# Patient Record
Sex: FEMALE | Race: WHITE | NOT HISPANIC OR LATINO | Employment: FULL TIME | URBAN - METROPOLITAN AREA
[De-identification: names, ages, dates, MRNs, and addresses within clinical notes are randomized per-mention and may not be internally consistent; named-entity substitution may affect disease eponyms.]

---

## 2018-04-27 ENCOUNTER — LAB REQUISITION (OUTPATIENT)
Dept: LAB | Facility: OTHER | Age: 23
End: 2018-04-27

## 2018-04-27 DIAGNOSIS — Z02.1 PHYSICAL EXAM, PRE-EMPLOYMENT: ICD-10-CM

## 2018-04-27 PROCEDURE — 85025 COMPLETE CBC W/AUTO DIFF WBC: CPT | Performed by: PREVENTIVE MEDICINE

## 2018-04-27 PROCEDURE — 80053 COMPREHEN METABOLIC PANEL: CPT | Performed by: PREVENTIVE MEDICINE

## 2018-04-27 PROCEDURE — 80061 LIPID PANEL: CPT | Performed by: PREVENTIVE MEDICINE

## 2018-04-27 PROCEDURE — 81001 URINALYSIS AUTO W/SCOPE: CPT | Performed by: PREVENTIVE MEDICINE

## 2018-04-28 LAB
ALBUMIN SERPL-MCNC: 5 G/DL (ref 3.5–5.2)
ALP SERPL-CCNC: 53 U/L (ref 39–117)
ALT SERPL W P-5'-P-CCNC: 14 U/L (ref 1–33)
AST SERPL-CCNC: 19 U/L (ref 1–32)
BACTERIA UR QL AUTO: ABNORMAL /HPF
BASOPHILS # BLD AUTO: 0.01 10*3/MM3 (ref 0–0.2)
BASOPHILS NFR BLD AUTO: 0.2 % (ref 0–1.5)
BILIRUB CONJ SERPL-MCNC: <0.2 MG/DL (ref 0–0.3)
BILIRUB SERPL-MCNC: 0.7 MG/DL (ref 0.1–1.2)
BILIRUB UR QL STRIP: NEGATIVE
BUN BLD-MCNC: 13 MG/DL (ref 6–20)
CALCIUM SPEC-SCNC: 10.1 MG/DL (ref 8.6–10.5)
CHLORIDE SERPL-SCNC: 100 MMOL/L (ref 98–107)
CHOLEST SERPL-MCNC: 222 MG/DL (ref 0–200)
CLARITY UR: CLEAR
CO2 SERPL-SCNC: 24.1 MMOL/L (ref 22–29)
COLOR UR: YELLOW
CREAT BLD-MCNC: 0.94 MG/DL (ref 0.57–1)
DEPRECATED RDW RBC AUTO: 46.3 FL (ref 37–54)
EOSINOPHIL # BLD AUTO: 0.04 10*3/MM3 (ref 0–0.7)
EOSINOPHIL NFR BLD AUTO: 0.7 % (ref 0.3–6.2)
ERYTHROCYTE [DISTWIDTH] IN BLOOD BY AUTOMATED COUNT: 13.5 % (ref 11.7–13)
GFR SERPL CREATININE-BSD FRML MDRD: 75 ML/MIN/1.73
GFR SERPL CREATININE-BSD FRML MDRD: 91 ML/MIN/1.73
GGT SERPL-CCNC: 16 U/L (ref 5–36)
GLUCOSE BLD-MCNC: 83 MG/DL (ref 65–99)
GLUCOSE UR STRIP-MCNC: NEGATIVE MG/DL
HCT VFR BLD AUTO: 45.1 % (ref 35.6–45.5)
HDLC SERPL-MCNC: 65 MG/DL (ref 40–60)
HGB BLD-MCNC: 14.6 G/DL (ref 11.9–15.5)
HGB UR QL STRIP.AUTO: ABNORMAL
HYALINE CASTS UR QL AUTO: ABNORMAL /LPF
IMM GRANULOCYTES # BLD: 0.01 10*3/MM3 (ref 0–0.03)
IMM GRANULOCYTES NFR BLD: 0.2 % (ref 0–0.5)
IRON 24H UR-MRATE: 160 MCG/DL (ref 37–145)
KETONES UR QL STRIP: NEGATIVE
LDH SERPL-CCNC: 212 U/L (ref 135–214)
LDLC SERPL CALC-MCNC: 147 MG/DL (ref 0–100)
LDLC/HDLC SERPL: 2.26 {RATIO}
LEUKOCYTE ESTERASE UR QL STRIP.AUTO: ABNORMAL
LYMPHOCYTES # BLD AUTO: 2.22 10*3/MM3 (ref 0.9–4.8)
LYMPHOCYTES NFR BLD AUTO: 36.4 % (ref 19.6–45.3)
MCH RBC QN AUTO: 30.4 PG (ref 26.9–32)
MCHC RBC AUTO-ENTMCNC: 32.4 G/DL (ref 32.4–36.3)
MCV RBC AUTO: 94 FL (ref 80.5–98.2)
MONOCYTES # BLD AUTO: 0.49 10*3/MM3 (ref 0.2–1.2)
MONOCYTES NFR BLD AUTO: 8 % (ref 5–12)
NEUTROPHILS # BLD AUTO: 3.34 10*3/MM3 (ref 1.9–8.1)
NEUTROPHILS NFR BLD AUTO: 54.7 % (ref 42.7–76)
NITRITE UR QL STRIP: NEGATIVE
PH UR STRIP.AUTO: <=5 [PH] (ref 5–8)
PHOSPHATE SERPL-MCNC: 3.9 MG/DL (ref 2.5–4.5)
PLATELET # BLD AUTO: 278 10*3/MM3 (ref 140–500)
PMV BLD AUTO: 10.5 FL (ref 6–12)
POTASSIUM BLD-SCNC: 4.5 MMOL/L (ref 3.5–5.2)
PROT SERPL-MCNC: 8.2 G/DL (ref 6–8.5)
PROT UR QL STRIP: NEGATIVE
RBC # BLD AUTO: 4.8 10*6/MM3 (ref 3.9–5.2)
RBC # UR: ABNORMAL /HPF
REF LAB TEST METHOD: ABNORMAL
SODIUM BLD-SCNC: 137 MMOL/L (ref 136–145)
SP GR UR STRIP: >=1.03 (ref 1–1.03)
SQUAMOUS #/AREA URNS HPF: ABNORMAL /HPF
TRIGL SERPL-MCNC: 50 MG/DL (ref 0–150)
URATE SERPL-MCNC: 5.8 MG/DL (ref 2.4–5.7)
UROBILINOGEN UR QL STRIP: ABNORMAL
VLDLC SERPL-MCNC: 10 MG/DL (ref 5–40)
WBC NRBC COR # BLD: 6.1 10*3/MM3 (ref 4.5–10.7)
WBC UR QL AUTO: ABNORMAL /HPF

## 2021-01-28 ENCOUNTER — APPOINTMENT (OUTPATIENT)
Dept: GENERAL RADIOLOGY | Facility: HOSPITAL | Age: 26
End: 2021-01-28

## 2021-01-28 ENCOUNTER — HOSPITAL ENCOUNTER (EMERGENCY)
Facility: HOSPITAL | Age: 26
Discharge: HOME | End: 2021-01-29
Attending: EMERGENCY MEDICINE | Admitting: EMERGENCY MEDICINE

## 2021-01-28 VITALS
HEIGHT: 68 IN | OXYGEN SATURATION: 96 % | WEIGHT: 190 LBS | DIASTOLIC BLOOD PRESSURE: 88 MMHG | RESPIRATION RATE: 18 BRPM | BODY MASS INDEX: 28.79 KG/M2 | TEMPERATURE: 98.2 F | SYSTOLIC BLOOD PRESSURE: 140 MMHG | HEART RATE: 92 BPM

## 2021-01-28 DIAGNOSIS — S80.01XA CONTUSION OF RIGHT KNEE, INITIAL ENCOUNTER: ICD-10-CM
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
## 2021-01-28 PROCEDURE — 99283 EMERGENCY DEPT VISIT LOW MDM: CPT

## 2021-01-28 PROCEDURE — 73560 X-RAY EXAM OF KNEE 1 OR 2: CPT

## 2021-12-09 ENCOUNTER — OFFICE VISIT (OUTPATIENT)
Dept: FAMILY MEDICINE CLINIC | Facility: CLINIC | Age: 26
End: 2021-12-09

## 2021-12-09 VITALS
BODY MASS INDEX: 31.22 KG/M2 | HEIGHT: 68 IN | WEIGHT: 206 LBS | TEMPERATURE: 99.8 F | RESPIRATION RATE: 18 BRPM | HEART RATE: 63 BPM | OXYGEN SATURATION: 98 % | DIASTOLIC BLOOD PRESSURE: 90 MMHG | SYSTOLIC BLOOD PRESSURE: 130 MMHG

## 2021-12-09 DIAGNOSIS — R05.8 COUGH WITH CONGESTION OF PARANASAL SINUS: ICD-10-CM

## 2021-12-09 DIAGNOSIS — R09.81 COUGH WITH CONGESTION OF PARANASAL SINUS: ICD-10-CM

## 2021-12-09 DIAGNOSIS — J01.10 ACUTE NON-RECURRENT FRONTAL SINUSITIS: Primary | ICD-10-CM

## 2021-12-09 PROCEDURE — 99203 OFFICE O/P NEW LOW 30 MIN: CPT | Performed by: NURSE PRACTITIONER

## 2021-12-09 RX ORDER — METHYLPREDNISOLONE 4 MG/1
TABLET ORAL
Qty: 21 TABLET | Refills: 0 | Status: SHIPPED | OUTPATIENT
Start: 2021-12-09

## 2021-12-09 RX ORDER — AMOXICILLIN AND CLAVULANATE POTASSIUM 875; 125 MG/1; MG/1
1 TABLET, FILM COATED ORAL EVERY 12 HOURS SCHEDULED
Qty: 20 TABLET | Refills: 0 | Status: SHIPPED | OUTPATIENT
Start: 2021-12-09

## 2021-12-09 NOTE — PROGRESS NOTES
"Chief Complaint  Cough (x 7 days   hasn't had a fever over 99.0, only other symptoms is fatigue but she said she isn't sleeping bc of the cough.  Took OTC Dayquil today around noon )    Moy Hill presents to [facility]    Review of Systems   Constitutional: Positive for fatigue and fever. Negative for chills.   HENT: Positive for congestion, postnasal drip and sinus pressure.    Respiratory: Positive for cough. Negative for shortness of breath and wheezing.    Cardiovascular: Negative.  Negative for chest pain, palpitations and leg swelling.   Gastrointestinal: Negative.  Negative for abdominal pain, constipation, diarrhea and vomiting.   Skin: Negative.  Negative for rash.   Neurological: Negative.  Negative for dizziness and light-headedness.   Psychiatric/Behavioral: Negative.    26-year-old female presents to the office today complaining of some ongoing sinus pain and pressure, nasal congestion, nonproductive cough, and low-grade fever for the past 7 to 10 days.  She is taking over-the-counter DayQuil that has only given her minor symptom relief.  She has had a low-grade fever T-max 99.4 and some lingering fatigue.  Both of her parents are also sick with similar symptoms.  No abdominal pain, no shortness of breath, no N//V/D.    She declines COVID-19 testing    She does not have any current medical problems.  She does not currently take any medications.    The following portions of the patient's history were reviewed and updated as appropriate: allergies, current medications, past family history, past medical history, past social history, past surgical history, and problem list       Objective   Vital Signs:   Vitals:    12/09/21 1546   BP: 130/90   BP Location: Left arm   Patient Position: Sitting   Cuff Size: Adult   Pulse: 63   Resp: 18   Temp: 99.8 °F (37.7 °C)   TempSrc: Skin   SpO2: 98%   Weight: 93.4 kg (206 lb)   Height: 172.7 cm (68\")        Physical " Exam  Constitutional:       General: She is not in acute distress.     Appearance: Normal appearance. She is normal weight. She is not ill-appearing.   HENT:      Head: Normocephalic.      Right Ear: Tympanic membrane, ear canal and external ear normal.      Left Ear: Tympanic membrane, ear canal and external ear normal.      Nose: No congestion.      Right Turbinates: Swollen.      Left Turbinates: Swollen.      Right Sinus: Frontal sinus tenderness present.      Left Sinus: Frontal sinus tenderness present.   Eyes:      Pupils: Pupils are equal, round, and reactive to light.   Cardiovascular:      Rate and Rhythm: Normal rate and regular rhythm.      Pulses: Normal pulses.      Heart sounds: Normal heart sounds. No murmur heard.      Pulmonary:      Effort: Pulmonary effort is normal.      Breath sounds: Normal breath sounds.   Abdominal:      General: Abdomen is flat.      Palpations: Abdomen is soft.   Musculoskeletal:      Cervical back: Neck supple.   Lymphadenopathy:      Cervical: No cervical adenopathy.   Skin:     General: Skin is warm and dry.      Capillary Refill: Capillary refill takes less than 2 seconds.      Findings: No rash.   Neurological:      Mental Status: She is alert and oriented to person, place, and time.   Psychiatric:         Mood and Affect: Mood normal.         Behavior: Behavior normal.         Thought Content: Thought content normal.         Judgment: Judgment normal.          Result Review :                Assessment and Plan    Diagnoses and all orders for this visit:    1. Acute non-recurrent frontal sinusitis (Primary)  -     methylPREDNISolone (MEDROL) 4 MG dose pack; follow package directions  Dispense: 21 tablet; Refill: 0  -     amoxicillin-clavulanate (Augmentin) 875-125 MG per tablet; Take 1 tablet by mouth Every 12 (Twelve) Hours. One PO BID for infection with food  Dispense: 20 tablet; Refill: 0    2. Cough with congestion of paranasal sinus  -     methylPREDNISolone  (MEDROL) 4 MG dose pack; follow package directions  Dispense: 21 tablet; Refill: 0  -     amoxicillin-clavulanate (Augmentin) 875-125 MG per tablet; Take 1 tablet by mouth Every 12 (Twelve) Hours. One PO BID for infection with food  Dispense: 20 tablet; Refill: 0      Augmentin  was sent to the patient's pharmacy-Continue Flonase nasal spray as needed  -Take all medications as prescribed and until completed.  -Monitor for fever and take Tylenol as needed.  Drink plenty of fluids and get plenty of rest.  -Use cool-mist humidifier  -Seek immediate medical attention for fever unrelieved by Tylenol, chest pain, shortness of breath, sharp back pain, or any other worsening signs or symptoms.  -The patient verbalized understanding of all instructions given today.    Follow Up   Return in about 1 month (around 1/9/2022), or if symptoms worsen or fail to improve, for Annual physical.  Patient was given instructions and counseling regarding her condition or for health maintenance advice. Please see specific information pulled into the AVS if appropriate.

## 2024-12-04 ENCOUNTER — INITIAL PRENATAL (OUTPATIENT)
Dept: OBSTETRICS AND GYNECOLOGY | Facility: CLINIC | Age: 29
End: 2024-12-04
Payer: COMMERCIAL

## 2024-12-04 VITALS — WEIGHT: 184.6 LBS | BODY MASS INDEX: 28.07 KG/M2 | DIASTOLIC BLOOD PRESSURE: 60 MMHG | SYSTOLIC BLOOD PRESSURE: 118 MMHG

## 2024-12-04 DIAGNOSIS — O34.219 PREVIOUS CESAREAN DELIVERY, ANTEPARTUM: ICD-10-CM

## 2024-12-04 DIAGNOSIS — O35.10X0 FAMILY HISTORIC RISK OF CHROMOSOMAL ABNORMALITY IN FETUS, ANTEPARTUM, SINGLE OR UNSPECIFIED FETUS: ICD-10-CM

## 2024-12-04 DIAGNOSIS — Z11.3 SCREEN FOR SEXUALLY TRANSMITTED DISEASES: ICD-10-CM

## 2024-12-04 DIAGNOSIS — N91.2 ABSENT MENSES: Primary | ICD-10-CM

## 2024-12-04 DIAGNOSIS — O09.899 SUPERVISION OF OTHER HIGH RISK PREGNANCIES, UNSPECIFIED TRIMESTER: ICD-10-CM

## 2024-12-04 RX ORDER — PRENATAL VIT/IRON FUM/FOLIC AC 27MG-0.8MG
TABLET ORAL DAILY
COMMUNITY

## 2024-12-04 NOTE — PROGRESS NOTES
"Chief Complaint  Initial Prenatal Visit    Subjective        Liz Nevarez presents to [facility]  History of Present Illness  Patient is here for initial prenatal appointment.  She has not been seen elsewhere anywhere this pregnancy.  She just recently had a Liletta IUD removed earlier this year in order to try to conceive.  She had had amenorrhea with the IUD, and she never had a period after having the IUD removed.  As such, her last menstrual period is uncertain at this time.    Patient reports having persistent nausea, but with no real emesis.  She had similar symptoms in her first pregnancy but recalls that not being as severe as this.  She denies pelvic pain or recent vaginal bleeding.    Patient's older child has trisomy 21.  They learned of this with cell free DNA testing and subsequently had amniocentesis during that pregnancy.  She says the child is doing very well at this time.  She had a  section with her first delivery due to nonreassuring fetal heart tones in labor.  Patient states that she would like to have another  this time.    # Outcome Date GA Labor/2nd Weight Sex Type Anes PTL Lv A1 A5   2 Current                 1 Term 19 39w3d  3035 g (6 lb 11.1 oz) M CS-LTranv Epidural N Living 7 9   Name: JERICA NEVAREZ   Complications: Fetal Intolerance   Location: [facility] ([facility] & DELIV[facility])   Delivering Clinician: Neetu Junior MD          Objective   Vital Signs:  /60   Wt 83.7 kg (184 lb 9.6 oz)   BMI 28.07 kg/m²   Estimated body mass index is 28.07 kg/m² as calculated from the following:    Height as of 21: 172.7 cm (68\").    Weight as of this encounter: 83.7 kg (184 lb 9.6 oz).          Physical Exam   General: No acute distress, awake and oriented x3   HEENT: Normocephalic atraumatic, moist mucous membranes   Eyes: Pupils equal round reactive to light and accommodation, extraocular muscles intact "   Respiratory: Normal work of breathing, good air movement   Extremities: No calf tenderness, no lower extremity edema   Psychiatric: Good judgment and insight, normal affect and mood   Neurologic: Cranial nerves II through XII intact, no deficits   Skin: No rashes or lesions     Result Review :             Pap (4/20240:  DIAGNOSIS:     NEGATIVE (No evidence of intraepithelial lesions or malignancy).    Ultrasound today:  Findings:  There is a single intrauterine pregnancy identified.  Contributor gestational sac is seen with a normal-appearing yolk sac.  Single fetal pole is identified with a crown-rump length measuring 1.37 cm, consistent with 7 weeks and 4 days.  Fetal cardiac activity is detected measuring 155 bpm.  Cervix is normal-appearing.    Left ovary: Normal appearing and measures 2.3 x 2.0 cm  There is no adnexal mass or cyst identified.    Right ovary: Normal appearing and measures 3.2 x 1.6 cm.  There is no adnexal mass or cyst identified.    There is no free fluid.    Impression:    Live single intrauterine pregnancy pregnancy at 7w4d  Normal.  Adnexal structures     Assessment and Plan   Diagnoses and all orders for this visit:    1. Absent menses (Primary)  -     POC Pregnancy, Urine  -     US Ob Transvaginal    2. Supervision of other high risk pregnancies, unspecified trimester  -     Urine Culture - Urine, Urine, Clean Catch  -     Urine Drug Screen - Urine, Clean Catch  -     Chlamydia trachomatis, Neisseria gonorrhoeae, Trichomonas vaginalis, PCR - Urine, Urine, Clean Catch    Initial prenatal counseling performed today. Educational handouts on prenatal care provided to patient. Discussed frequency of visits, lab testing, OTC medications, physical activity, exercise, dietary restrictions, potential exposures (COVID, Zika, Toxo, etc). Hospital and call coverage system discussed. Pt is recommended to start PNV.     We discussed options for screening for aneuploidy.  She does have a history of a  child with aneuploidy, so she is at slightly increased risk.  The couple has had extensive genetic counseling in the past.  Patient reports that she herself has had a karyotype which was unremarkable.  We discussed the totality of the options including no screening, cell free DNA testing, serum and sequential screening, targeted ultrasound, and diagnostic testing including chorionic villous sampling and amniocentesis.  They prefer just to start with cell free DNA testing.  They state they would not wish to terminate the pregnancy based on the results of testing.  They declined referral to maternal-fetal medicine for early diagnostic testing such as chorionic villous sampling.    We will thus plan cell free DNA testing in 3 weeks.  We can defer routine prenatal labs to that visit.    3. Family historic risk of chromosomal abnormality in fetus, antepartum, single or unspecified fetus    Discussed the patient's history of a previous infant born with a chromosomal abnormality.  We discussed the typical risks of recurrence.  Patient herself has had a normal karyotype performed.  We discussed the totality of the options including no screening, cell free DNA testing, serum and sequential screening, targeted ultrasound, and diagnostic testing including chorionic villous sampling and amniocentesis.  They prefer just to start with cell free DNA testing.  They state they would not wish to terminate the pregnancy based on the results of testing.  They declined referral to maternal-fetal medicine for early diagnostic testing such as chorionic villous sampling.    We will plan for his cell free DNA testing at the next visit.    4. Previous  delivery, antepartum    I discussed with patient at length her options regarding trial of labor versus repeat  section.  We discussed the risk of a trial of labor including risks of uterine rupture (roughly 1/200 trials of labor following one or two prior low transverse   section).  We discussed the significance of uterine rupture including but not limited to NICU admission, significant  birth trauma, anoxic brain injury, cerebral palsy,  demise, and risk for the mother including injury to bowel or bladder, extension into the uterine arteries possibly with massive bleeding and need for blood transfusion, possible need for hysterectomy, organ failure, nerve damage, and death.  We discussed factors regarding likelihood of success of a trial of labor following  section. We also discussed the risks of  delivery discussed including bleeding, blood transfusion, infection, scar, wound breakdown, pain, need for postop pain medications, inadvertent injury to GI/ structures and possible need for surgical repair at time of surgery or in the future, urinary retention, DVT, anesthesia complications, temporary or permanent nerve damage, organ failure, and even death as well as potential need for  section in future pregnancies and subsequent morbidity therein.  All questions answered.  Patient states that she opts for elective repeat  section.    5. Screen for sexually transmitted diseases  -     Chlamydia trachomatis, Neisseria gonorrhoeae, Trichomonas vaginalis, PCR - Urine, Urine, Clean Catch             Follow Up   Return OB follow-up in 3 weeks.  Initial labs/operating a testing at that time.  Patient was given instructions and counseling regarding her condition or for health maintenance advice. Please see specific information pulled into the AVS if appropriate.

## 2024-12-05 LAB
AMPHETAMINES UR QL SCN: NEGATIVE NG/ML
BARBITURATES UR QL SCN: NEGATIVE NG/ML
BENZODIAZ UR QL SCN: NEGATIVE NG/ML
BZE UR QL SCN: NEGATIVE NG/ML
CANNABINOIDS UR QL SCN: NEGATIVE NG/ML
CREAT UR-MCNC: 316.7 MG/DL (ref 20–300)
LABORATORY COMMENT REPORT: ABNORMAL
METHADONE UR QL SCN: NEGATIVE NG/ML
OPIATES UR QL SCN: NEGATIVE NG/ML
OXYCODONE+OXYMORPHONE UR QL SCN: NEGATIVE NG/ML
PCP UR QL: NEGATIVE NG/ML
PH UR: 5.5 [PH] (ref 4.5–8.9)
PROPOXYPH UR QL SCN: NEGATIVE NG/ML

## 2024-12-06 LAB
BACTERIA UR CULT: NO GROWTH
BACTERIA UR CULT: NORMAL
C TRACH RRNA SPEC QL NAA+PROBE: NEGATIVE
N GONORRHOEA RRNA SPEC QL NAA+PROBE: NEGATIVE
T VAGINALIS RRNA SPEC QL NAA+PROBE: NEGATIVE

## 2024-12-23 ENCOUNTER — TELEPHONE (OUTPATIENT)
Dept: OBSTETRICS AND GYNECOLOGY | Facility: CLINIC | Age: 29
End: 2024-12-23
Payer: COMMERCIAL

## 2024-12-23 NOTE — TELEPHONE ENCOUNTER
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

## 2024-12-23 NOTE — TELEPHONE ENCOUNTER
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   Requesting work note for restrictions. Do you feel pt is needing restrictions? If so, what restriction are pt allowed to have

## 2024-12-26 ENCOUNTER — ROUTINE PRENATAL (OUTPATIENT)
Dept: OBSTETRICS AND GYNECOLOGY | Facility: CLINIC | Age: 29
End: 2024-12-26
Payer: COMMERCIAL

## 2024-12-26 VITALS — DIASTOLIC BLOOD PRESSURE: 79 MMHG | SYSTOLIC BLOOD PRESSURE: 123 MMHG | WEIGHT: 185.6 LBS | BODY MASS INDEX: 28.22 KG/M2

## 2024-12-26 DIAGNOSIS — O36.8390 UNABLE TO HEAR FETAL HEART TONES AS REASON FOR ULTRASOUND SCAN: ICD-10-CM

## 2024-12-26 DIAGNOSIS — O35.10X0 FAMILY HISTORIC RISK OF CHROMOSOMAL ABNORMALITY IN FETUS, ANTEPARTUM, SINGLE OR UNSPECIFIED FETUS: ICD-10-CM

## 2024-12-26 DIAGNOSIS — O09.899 SUPERVISION OF OTHER HIGH RISK PREGNANCIES, UNSPECIFIED TRIMESTER: ICD-10-CM

## 2024-12-26 DIAGNOSIS — Z11.3 SCREEN FOR SEXUALLY TRANSMITTED DISEASES: ICD-10-CM

## 2024-12-26 DIAGNOSIS — O34.219 PREVIOUS CESAREAN DELIVERY, ANTEPARTUM: ICD-10-CM

## 2024-12-26 DIAGNOSIS — Z3A.10 10 WEEKS GESTATION OF PREGNANCY: Primary | ICD-10-CM

## 2024-12-26 LAB
GLUCOSE UR STRIP-MCNC: NEGATIVE MG/DL
PROT UR STRIP-MCNC: NEGATIVE MG/DL

## 2024-12-26 NOTE — PROGRESS NOTES
Chief complaint: Patient here for routine OB visit.    History of present illness:   Patient is here for her routine prenatal visit.  She still having some nausea but is generally getting by.  She is otherwise doing well.  No major complaints at this time.  She denies contractions, vaginal bleeding, leakage of fluid.    She has some questions about work status.  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    Objective: See vital signs in the flowsheet  General: No acute distress, awake and oriented x3  Abdomen: Soft, nontender, gravid, fetal heart tones could not be detected with bedside Doppler  Extremities: No lower extremity edema, no calf tenderness  Psychiatric: Good judgment insight, normal affect and mood  Neurologic: Cranial nerves II through XII intact, no gross deficits    Ultrasound today:   Live single intrauterine pregnancy measuring 10 weeks and 6 days today.    Assessment:  1.  29-year-old  2 para 1 at 10-5/7 weeks gestational age  2.  History of previous infant with trisomy 21  3.  Previous low-transverse  delivery, declines a trial of labor  4.  Fetal heart tones not heard on bedside Doppler today, ultrasound reassuring    Plan:  1.  Ultrasound findings reassuring today.  2.  Discussed routine prenatal testing.  Patient wishes to proceed at this time.  She does wish for NIPT testing but does not want referral to maternal-fetal medicine.  Please see previous note for discussion.  3.  We discussed restrictions at her work.  Explained that light duty should be reasonable for likely the duration of the pregnancy as long as she is not at risk of falling, getting hit in the stomach, etc.  4. RTC 4 wks

## 2024-12-27 LAB
ABO GROUP BLD: ABNORMAL
BASOPHILS # BLD AUTO: 0 X10E3/UL (ref 0–0.2)
BASOPHILS NFR BLD AUTO: 0 %
BLD GP AB SCN SERPL QL: NEGATIVE
EOSINOPHIL # BLD AUTO: 0 X10E3/UL (ref 0–0.4)
EOSINOPHIL NFR BLD AUTO: 1 %
ERYTHROCYTE [DISTWIDTH] IN BLOOD BY AUTOMATED COUNT: 12.1 % (ref 11.7–15.4)
HBV SURFACE AG SERPL QL IA: NEGATIVE
HCT VFR BLD AUTO: 42.7 % (ref 34–46.6)
HCV AB SERPL QL IA: NORMAL
HCV IGG SERPL QL IA: NON REACTIVE
HGB BLD-MCNC: 14 G/DL (ref 11.1–15.9)
HIV 1+2 AB+HIV1 P24 AG SERPL QL IA: NON REACTIVE
IMM GRANULOCYTES # BLD AUTO: 0 X10E3/UL (ref 0–0.1)
IMM GRANULOCYTES NFR BLD AUTO: 0 %
LYMPHOCYTES # BLD AUTO: 2 X10E3/UL (ref 0.7–3.1)
LYMPHOCYTES NFR BLD AUTO: 25 %
MCH RBC QN AUTO: 30.4 PG (ref 26.6–33)
MCHC RBC AUTO-ENTMCNC: 32.8 G/DL (ref 31.5–35.7)
MCV RBC AUTO: 93 FL (ref 79–97)
MONOCYTES # BLD AUTO: 0.5 X10E3/UL (ref 0.1–0.9)
MONOCYTES NFR BLD AUTO: 6 %
NEUTROPHILS # BLD AUTO: 5.6 X10E3/UL (ref 1.4–7)
NEUTROPHILS NFR BLD AUTO: 68 %
PLATELET # BLD AUTO: 248 X10E3/UL (ref 150–450)
RBC # BLD AUTO: 4.6 X10E6/UL (ref 3.77–5.28)
RH BLD: POSITIVE
RUBV IGG SERPL IA-ACNC: <0.9 INDEX
TREPONEMA PALLIDUM IGG+IGM AB [PRESENCE] IN SERUM OR PLASMA BY IMMUNOASSAY: NON REACTIVE
WBC # BLD AUTO: 8.2 X10E3/UL (ref 3.4–10.8)

## 2024-12-30 LAB
CFDNA.FET/CFDNA.TOTAL SFR FETUS: NORMAL %
CITATION REF LAB TEST: NORMAL
FET 13+18+21+X+Y ANEUP PLAS.CFDNA: NEGATIVE
FET CHR 21 TS PLAS.CFDNA QL: NEGATIVE
FET SEX PLAS.CFDNA DOSAGE CFDNA: NORMAL
FET TS 13 RISK PLAS.CFDNA QL: NEGATIVE
FET TS 18 RISK WBC.DNA+CFDNA QL: NEGATIVE
GA EST FROM CONCEPTION DATE: NORMAL D
GESTATIONAL AGE > 9:: YES
LAB DIRECTOR NAME PROVIDER: NORMAL
LAB DIRECTOR NAME PROVIDER: NORMAL
LABORATORY COMMENT REPORT: NORMAL
LIMITATIONS OF THE TEST: NORMAL
NEGATIVE PREDICTIVE VALUE: NORMAL
NOTE: NORMAL
PERFORMANCE CHARACTERISTICS: NORMAL
POSITIVE PREDICTIVE VALUE: NORMAL
REF LAB TEST METHOD: NORMAL
TEST PERFORMANCE INFO SPEC: NORMAL

## 2025-01-23 ENCOUNTER — ROUTINE PRENATAL (OUTPATIENT)
Dept: OBSTETRICS AND GYNECOLOGY | Facility: CLINIC | Age: 30
End: 2025-01-23
Payer: COMMERCIAL

## 2025-01-23 VITALS — SYSTOLIC BLOOD PRESSURE: 138 MMHG | DIASTOLIC BLOOD PRESSURE: 79 MMHG | BODY MASS INDEX: 29.47 KG/M2 | WEIGHT: 193.8 LBS

## 2025-01-23 DIAGNOSIS — O09.899 SUPERVISION OF OTHER HIGH RISK PREGNANCIES, UNSPECIFIED TRIMESTER: ICD-10-CM

## 2025-01-23 DIAGNOSIS — Z3A.14 14 WEEKS GESTATION OF PREGNANCY: Primary | ICD-10-CM

## 2025-01-23 DIAGNOSIS — O34.219 PREVIOUS CESAREAN DELIVERY, ANTEPARTUM: ICD-10-CM

## 2025-01-23 DIAGNOSIS — O35.10X0 FAMILY HISTORIC RISK OF CHROMOSOMAL ABNORMALITY IN FETUS, ANTEPARTUM, SINGLE OR UNSPECIFIED FETUS: ICD-10-CM

## 2025-01-23 LAB
GLUCOSE UR STRIP-MCNC: NEGATIVE MG/DL
PROT UR STRIP-MCNC: NEGATIVE MG/DL

## 2025-01-23 NOTE — PROGRESS NOTES
Chief complaint: Patient here for routine OB visit.    History of present illness: No major complaints at this time.  She denies contractions, vaginal bleeding, leakage of fluid.  She reports active fetal movement.    Objective: See vital signs in the flowsheet  General: No acute distress, awake and oriented x3  Abdomen: Soft, nontender, gravid, fetal heart tones 150  Extremities: No lower extremity edema, no calf tenderness  Psychiatric: Good judgment insight, normal affect and mood  Neurologic: Cranial nerves II through XII intact, no gross deficits    Labs:   NIPT 46XY    Assessment:  1.  29-year-old  2 para 1 at 14-5/7 weeks gestational age  2.  Previous child with trisomy 21  3.  Previous low-transverse  delivery, declines trial of labor  4.  Rubella nonimmune    Plan:  1.  Recent lab results discussed with patient fair limitations of testing regarding self radiating testing was discussed.  2.  Return to the office in 4 weeks.  Ultrasound for anatomy at next visit.

## 2025-02-25 ENCOUNTER — ROUTINE PRENATAL (OUTPATIENT)
Dept: OBSTETRICS AND GYNECOLOGY | Facility: CLINIC | Age: 30
End: 2025-02-25
Payer: COMMERCIAL

## 2025-02-25 VITALS — WEIGHT: 193 LBS | DIASTOLIC BLOOD PRESSURE: 64 MMHG | SYSTOLIC BLOOD PRESSURE: 114 MMHG | BODY MASS INDEX: 29.35 KG/M2

## 2025-02-25 DIAGNOSIS — O35.10X0 FAMILY HISTORIC RISK OF CHROMOSOMAL ABNORMALITY IN FETUS, ANTEPARTUM, SINGLE OR UNSPECIFIED FETUS: ICD-10-CM

## 2025-02-25 DIAGNOSIS — O09.899 SUPERVISION OF OTHER HIGH RISK PREGNANCIES, UNSPECIFIED TRIMESTER: ICD-10-CM

## 2025-02-25 DIAGNOSIS — O34.219 PREVIOUS CESAREAN DELIVERY, ANTEPARTUM: ICD-10-CM

## 2025-02-25 DIAGNOSIS — Z3A.19 19 WEEKS GESTATION OF PREGNANCY: Primary | ICD-10-CM

## 2025-02-25 LAB
GLUCOSE UR STRIP-MCNC: NEGATIVE MG/DL
PROT UR STRIP-MCNC: NEGATIVE MG/DL

## 2025-02-25 NOTE — PROGRESS NOTES
Chief complaint: Patient here for routine OB visit.    History of present illness: No major complaints at this time.  She denies contractions, vaginal bleeding, leakage of fluid.  She reports active fetal movement.  Patient had questions about exercise.  She says she often runs is much as 6-8 miles in general.  She says she has been continuing this the pregnancy as long as she feels well.  She says when she does go for runs during this pregnancy she does not report feeling poorly or pains.    Objective: See vital signs in the flowsheet  General: No acute distress, awake and oriented x3  Abdomen: Soft, nontender, gravid, fetal heart tones 138, fundal height 20 cm  Extremities: No lower extremity edema, no calf tenderness  Psychiatric: Good judgment insight, normal affect and mood  Neurologic: Cranial nerves II through XII intact, no gross deficits    Ultrasound today:   Appropriate growth for gestational age  Normal anatomic survey, anatomic survey is complete.  Normal cervical length.  Normal amniotic fluid.  Anterior placenta with no previa.    Assessment:  1.  29-year-old  2 para 1 at 19-3/7 weeks gestational age  2.  Previous  x 1, declines a trial of labor  3.  Rubella nonimmune  4.  First child with trisomy 21, normal NIPT this pregnancy    Plan:  1.  Ultrasound findings in today were discussed with the patient.  Limitations of ultrasound were explained.  2.  We discussed exercise in pregnancy.  3.  Return to the office in 4 weeks.

## 2025-03-25 ENCOUNTER — ROUTINE PRENATAL (OUTPATIENT)
Dept: OBSTETRICS AND GYNECOLOGY | Facility: CLINIC | Age: 30
End: 2025-03-25
Payer: COMMERCIAL

## 2025-03-25 ENCOUNTER — PATIENT MESSAGE (OUTPATIENT)
Dept: OBSTETRICS AND GYNECOLOGY | Facility: CLINIC | Age: 30
End: 2025-03-25

## 2025-03-25 VITALS — WEIGHT: 197.2 LBS | SYSTOLIC BLOOD PRESSURE: 123 MMHG | DIASTOLIC BLOOD PRESSURE: 74 MMHG | BODY MASS INDEX: 29.98 KG/M2

## 2025-03-25 DIAGNOSIS — O34.219 PREVIOUS CESAREAN DELIVERY, ANTEPARTUM: ICD-10-CM

## 2025-03-25 DIAGNOSIS — O35.10X0 FAMILY HISTORIC RISK OF CHROMOSOMAL ABNORMALITY IN FETUS, ANTEPARTUM, SINGLE OR UNSPECIFIED FETUS: ICD-10-CM

## 2025-03-25 DIAGNOSIS — O09.899 SUPERVISION OF OTHER HIGH RISK PREGNANCIES, UNSPECIFIED TRIMESTER: ICD-10-CM

## 2025-03-25 DIAGNOSIS — Z11.3 SCREEN FOR SEXUALLY TRANSMITTED DISEASES: ICD-10-CM

## 2025-03-25 DIAGNOSIS — Z3A.23 23 WEEKS GESTATION OF PREGNANCY: Primary | ICD-10-CM

## 2025-03-25 LAB
GLUCOSE UR STRIP-MCNC: NEGATIVE MG/DL
PROT UR STRIP-MCNC: NEGATIVE MG/DL

## 2025-03-25 PROCEDURE — 0502F SUBSEQUENT PRENATAL CARE: CPT | Performed by: OBSTETRICS & GYNECOLOGY

## 2025-03-25 NOTE — PROGRESS NOTES
Chief complaint: Patient here for routine OB visit.    History of present illness: Patient here for routine OB appointment.  She denies contractions, vaginal bleeding, leakage of fluid.  She reports active fetal movement.  She does report started to have some issues with sciatic type pain in the lower right side of her back.  She has tried doing some exercise with help with this.  She is also taking Tylenol occasionally which has helped.    Patient reports that she is starting to have second thoughts about wanting to have an elective repeat .  She says she may be interested in trial of labor.  Patient reports that her first pregnancy was delivered via  due to nonreassuring fetal heart tones.  It seems that shortly after obtaining the epidural she started to have nonreassuring fetal heart tones and she was taken for urgent  section.  Baby also had trisomy 21.    Objective: See vital signs in the flowsheet  General: No acute distress, awake and oriented x3  Abdomen: Soft, nontender, gravid, fetal heart tones 140, fundal height 24 cm  Extremities: No lower extremity edema, no calf tenderness  Psychiatric: Good judgment insight, normal affect and mood  Neurologic: Cranial nerves II through XII intact, no gross deficits      Assessment:  1.  29-year-old  2 para 1 at 23-3/7 weeks gestational age  2.  Previous  x 1  3.  First child with trisomy 21, normal NIPT testing this pregnancy    Plan:  1.  We discussed patient's concerns about repeat  versus trial of labor.  Again today we had an extensive discussion regarding the risks of a trial of labor after  section including risks of uterine scar dehiscence, which roughly equals 1/200 trials of labor.  Explained that uterine scar dehiscence can be catastrophic for both the baby and the mother leading to fetal demise, anoxic brain injury, significant morbidity to the mother including possible need for hysterectomy,  "massive bleeding, blood transfusion, damage to adjacent structures such as bowel and bladder, infections, DVT, etc.  The maternal-fetal medicine units network vaginal birth after  section predictor model gives this patient a predicted chance of 71.7% successful .  As per ACOG practice bulletin #205 on vaginal birth after  delivery:  \"Although there is no universally agreed upon discriminatory point, evidence suggests that women with at least a 60-70% likelihood of achieving a  who attempt TOLAC experience the same or less maternal morbidity than women who have an elective repeat  delivery\" and \"composite  morbidity was similar between women who attempted TOLAC and women who had an elective repeat  delivery if the probability of achieving  was 70% or greater\"    Patient will continue to weigh her options going forward    2.  Plan 28-week labs next visit.    3.  We discussed stretches and exercises to help with sciatic pain.  Reassurance offered.  May continue Tylenol as needed.      "

## 2025-04-22 ENCOUNTER — ROUTINE PRENATAL (OUTPATIENT)
Dept: OBSTETRICS AND GYNECOLOGY | Facility: CLINIC | Age: 30
End: 2025-04-22
Payer: COMMERCIAL

## 2025-04-22 VITALS — SYSTOLIC BLOOD PRESSURE: 119 MMHG | WEIGHT: 203.4 LBS | DIASTOLIC BLOOD PRESSURE: 75 MMHG | BODY MASS INDEX: 30.93 KG/M2

## 2025-04-22 DIAGNOSIS — O34.219 PREVIOUS CESAREAN DELIVERY, ANTEPARTUM: ICD-10-CM

## 2025-04-22 DIAGNOSIS — O35.10X0 FAMILY HISTORIC RISK OF CHROMOSOMAL ABNORMALITY IN FETUS, ANTEPARTUM, SINGLE OR UNSPECIFIED FETUS: ICD-10-CM

## 2025-04-22 DIAGNOSIS — O09.899 SUPERVISION OF OTHER HIGH RISK PREGNANCIES, UNSPECIFIED TRIMESTER: ICD-10-CM

## 2025-04-22 DIAGNOSIS — Z23 NEED FOR TDAP VACCINATION: ICD-10-CM

## 2025-04-22 DIAGNOSIS — Z3A.27 27 WEEKS GESTATION OF PREGNANCY: Primary | ICD-10-CM

## 2025-04-22 LAB
GLUCOSE UR STRIP-MCNC: NEGATIVE MG/DL
PROT UR STRIP-MCNC: NEGATIVE MG/DL

## 2025-04-22 NOTE — PROGRESS NOTES
Pt received the TDaP vaccine today, office supplied. Injection was given in the right deltoid. Pt tolerated well with no reaction.

## 2025-04-22 NOTE — PROGRESS NOTES
Chief complaint: Patient here for routine OB visit.    History of present illness: No major complaints at this time.  She denies contractions, vaginal bleeding, leakage of fluid.  She reports active fetal movement.  Patient has decided she would like to have a trial of labor.    Objective: See vital signs in the flowsheet  General: No acute distress, awake and oriented x3  Abdomen: Soft, nontender, gravid, fetal heart tones 140, fundal height 28 cm  Extremities: No lower extremity edema, no calf tenderness  Psychiatric: Good judgment insight, normal affect and mood  Neurologic: Cranial nerves II through XII intact, no gross deficits    Assessment:  1.  29-year-old  2 para 1 at 27-3/7 weeks gestational age  2.  Previous low-transverse  delivery x 1, desires a trial of labor  3.  Previous child with chromosomal abnormality  4.  Rubella nonimmune    Plan:  1.  We again discussed patient's desires for options of a trial of labor versus repeat .  Patient has decided she would like to have a trial of labor.  Risk, benefits, and alternatives have been discussed at length and again today.  Feel a trial of labor is reasonable.  2.  28-week labs today.  3.  Tdap is recommended today.  Risks, benefits, and side effects discussed.  She agrees with plan for Tdap today.  4.  Return to the office in 2 weeks.  Plan ultrasound for growth in 4 weeks.

## 2025-05-06 ENCOUNTER — ROUTINE PRENATAL (OUTPATIENT)
Dept: OBSTETRICS AND GYNECOLOGY | Facility: CLINIC | Age: 30
End: 2025-05-06
Payer: COMMERCIAL

## 2025-05-06 VITALS — BODY MASS INDEX: 31.02 KG/M2 | DIASTOLIC BLOOD PRESSURE: 75 MMHG | SYSTOLIC BLOOD PRESSURE: 115 MMHG | WEIGHT: 204 LBS

## 2025-05-06 DIAGNOSIS — Z3A.29 29 WEEKS GESTATION OF PREGNANCY: ICD-10-CM

## 2025-05-06 DIAGNOSIS — Z34.93 THIRD TRIMESTER PREGNANCY: Primary | ICD-10-CM

## 2025-05-06 LAB
GLUCOSE UR STRIP-MCNC: NEGATIVE MG/DL
PROT UR STRIP-MCNC: ABNORMAL MG/DL

## 2025-05-06 NOTE — PROGRESS NOTES
OB VISIT 29 WEEKS      Chief Complaint   Patient presents with    Routine Prenatal Visit         Liz is a 30 y.o.  29w3d being seen today for her obstetrical visit.  Patient reports no complaints. Fetal movement: normal. The patient currently sees Dr. Richardson.      REVIEW OF SYSTEMS  Cramping/contractions: denies  Vaginal bleeding: denies  Fetal movement: present    Review of Systems   Eyes:  Negative for blurred vision, double vision and visual disturbance.   Gastrointestinal:  Negative for abdominal pain.   Neurological:  Negative for light-headedness and headache.   All other systems reviewed and are negative.      /75   Wt 92.5 kg (204 lb)   LMP  (LMP Unknown)   BMI 31.02 kg/m²     Prenatal Assessment  Fetal Heart Rate: 132  Fundal Height (cm): 29 cm  Movement: Present  Presentation: Vertex  Edema  LLE Edema: None  RLE Edema: None  Facial Edema: None    Physical Exam  Constitutional:       General: She is awake.      Appearance: Normal appearance. She is well-developed and well-groomed.   HENT:      Head: Normocephalic and atraumatic.   Pulmonary:      Effort: Pulmonary effort is normal.   Musculoskeletal:      Cervical back: Normal range of motion.   Neurological:      General: No focal deficit present.      Mental Status: She is alert and oriented to person, place, and time.   Skin:     General: Skin is warm and dry.   Psychiatric:         Mood and Affect: Mood normal.         Behavior: Behavior normal. Behavior is cooperative.   Vitals reviewed.         ASSESSMENT/PLAN    Diagnoses and all orders for this visit:    1. Third trimester pregnancy (Primary)  -     POC Urinalysis Dipstick    2. 29 weeks gestation of pregnancy  -     POC Urinalysis Dipstick         Urine dip today:  trace protein, negative glucose.   Labs and 1 hour GTT reviewed today - WNL.  Reviewed this stage of pregnancy.  Problem list updated.   Reviewed common symptoms of the third trimester.    Counseled on labor  precautions.   Patient is aware of the location of L&D.      Follow up in 2 weeks with Dr. Richardson.     I spent 15 minutes caring for Liz on this date of service. This time includes time spent by me in the following activities: preparing for the visit, reviewing tests, performing a medically appropriate examination and/or evaluation, counseling and educating the patient/family/caregiver, referring and communicating with other health care professionals, documenting information in the medical record, independently interpreting results and communicating that information with the patient/family/caregiver, care coordination, ordering medications, ordering test(s), ordering procedure(s), obtaining a separately obtained history, and reviewing a separately obtained history.     Julita Abernathy CNM  5/9/2025  09:42 EDT

## 2025-05-22 ENCOUNTER — ROUTINE PRENATAL (OUTPATIENT)
Dept: OBSTETRICS AND GYNECOLOGY | Facility: CLINIC | Age: 30
End: 2025-05-22
Payer: COMMERCIAL

## 2025-05-22 VITALS — DIASTOLIC BLOOD PRESSURE: 80 MMHG | WEIGHT: 211 LBS | BODY MASS INDEX: 32.08 KG/M2 | SYSTOLIC BLOOD PRESSURE: 116 MMHG

## 2025-05-22 DIAGNOSIS — Z3A.31 31 WEEKS GESTATION OF PREGNANCY: Primary | ICD-10-CM

## 2025-05-22 LAB
GLUCOSE UR STRIP-MCNC: NEGATIVE MG/DL
PROT UR STRIP-MCNC: NEGATIVE MG/DL

## 2025-05-22 NOTE — PROGRESS NOTES
Chief complaint: Patient here for routine OB visit.    History of present illness: No major complaints at this time.  She denies contractions, vaginal bleeding, leakage of fluid.  She reports active fetal movement.    Objective: See vital signs in the flowsheet  General: No acute distress, awake and oriented x3  Abdomen: Soft, nontender, gravid, fetal heart tones 133, fundal height 31 cm  Extremities: No lower extremity edema, no calf tenderness  Psychiatric: Good judgment insight, normal affect and mood  Neurologic: Cranial nerves II through XII intact, no gross deficits    Ultrasound today:   Appropriate growth for gestational age with the estimated fetal weight at the 59th percentile and the abdominal circumference at the 43rd percentile  Normal amniotic fluid  Cephalic presentation    Assessment:  1.  30-year-old  2 para 1 at 31-5/7 weeks gestational age  2.  History of previous  x 1, desires trial of labor    Plan:  1.  Ultrasound findings discussed with the patient.  Limitations explained.  2.  Otherwise routine prenatal care.  Return to the office in 2 weeks.

## 2025-06-03 ENCOUNTER — ROUTINE PRENATAL (OUTPATIENT)
Dept: OBSTETRICS AND GYNECOLOGY | Facility: CLINIC | Age: 30
End: 2025-06-03
Payer: COMMERCIAL

## 2025-06-03 VITALS — WEIGHT: 213 LBS | SYSTOLIC BLOOD PRESSURE: 123 MMHG | BODY MASS INDEX: 32.39 KG/M2 | DIASTOLIC BLOOD PRESSURE: 77 MMHG

## 2025-06-03 DIAGNOSIS — Z3A.33 33 WEEKS GESTATION OF PREGNANCY: Primary | ICD-10-CM

## 2025-06-03 LAB
GLUCOSE UR STRIP-MCNC: NEGATIVE MG/DL
PROT UR STRIP-MCNC: NEGATIVE MG/DL

## 2025-06-03 NOTE — PROGRESS NOTES
Chief complaint: Patient here for routine OB visit.    History of present illness: No major complaints at this time.  She denies contractions, vaginal bleeding, leakage of fluid.  She reports active fetal movement.    Objective: See vital signs in the flowsheet  General: No acute distress, awake and oriented x3  Abdomen: Soft, nontender, gravid, fetal heart tones 130, fundal height 33 cm  Extremities: No lower extremity edema, no calf tenderness  Psychiatric: Good judgment insight, normal affect and mood  Neurologic: Cranial nerves II through XII intact, no gross deficits    Assessment:  1.  30-year-old  2 para 1 at 33-3/7 weeks gestational age  2.  History of prior  x 1, desires trial of labor  3.  History of previous child with trisomy 21    Plan:  1.  Patient doing very well at this time.  Routine prenatal care.  Return to the office in 2 weeks.

## 2025-06-17 ENCOUNTER — ROUTINE PRENATAL (OUTPATIENT)
Dept: OBSTETRICS AND GYNECOLOGY | Facility: CLINIC | Age: 30
End: 2025-06-17
Payer: COMMERCIAL

## 2025-06-17 VITALS — DIASTOLIC BLOOD PRESSURE: 80 MMHG | WEIGHT: 218 LBS | BODY MASS INDEX: 33.15 KG/M2 | SYSTOLIC BLOOD PRESSURE: 122 MMHG

## 2025-06-17 DIAGNOSIS — Z3A.35 35 WEEKS GESTATION OF PREGNANCY: Primary | ICD-10-CM

## 2025-06-17 LAB
GLUCOSE UR STRIP-MCNC: NEGATIVE MG/DL
PROT UR STRIP-MCNC: NEGATIVE MG/DL

## 2025-06-17 NOTE — PROGRESS NOTES
Chief complaint: Patient here for routine OB visit.    History of present illness: No major complaints at this time.  She denies contractions, vaginal bleeding, leakage of fluid.  She reports active fetal movement.    Objective: See vital signs in the flowsheet  General: No acute distress, awake and oriented x3  Abdomen: Soft, nontender, gravid, fetal heart tones 155  Extremities: No lower extremity edema, no calf tenderness  Psychiatric: Good judgment insight, normal affect and mood  Neurologic: Cranial nerves II through XII intact, no gross deficits      Assessment:  1.  30-year-old  2 para 1 at 35-3/7 weeks gestational age  2.  History of previous  x 1 (indication nonreassuring fetal heart tones after epidural), desires a trial of labor    Plan:  1.  Patient doing well at this time.  Routine prenatal care.  2.  Patient is hopeful for trial of labor.  Discussed the benefits of spontaneous labor when attempting TOLAC.  Would not recommend labor induction until 41 weeks gestation unless medically indicated.  Patient agrees with this strategy.  3.  Return to the office in 1 week.  GBS next visit.

## 2025-06-22 NOTE — PROGRESS NOTES
OB VISIT 36 WEEKS      Chief Complaint   Patient presents with    Routine Prenatal Visit         Liz is a 30 y.o.  36w5d being seen today for her obstetrical visit.  Patient reports no complaints. Fetal movement: normal. The patient currently sees Dr. Richardson.       REVIEW OF SYSTEMS  Cramping/contractions: denies  Vaginal bleeding: denies  Fetal movement: present  Leaking of fluid: denies    Review of Systems   Eyes:  Negative for blurred vision, double vision and visual disturbance.   Gastrointestinal:  Negative for abdominal pain.   Neurological:  Negative for light-headedness and headache.   All other systems reviewed and are negative.      /72   Wt 99.3 kg (219 lb)   LMP  (LMP Unknown)   BMI 33.30 kg/m²     Prenatal Assessment  Fetal Heart Rate: 138  Fundal Height (cm): 36 cm  Movement: Present  Presentation: Vertex  Dilation/Effacement/Station  Dilation: 1  Effacement (%): 20  Station: -3    Physical Exam  Constitutional:       General: She is awake.      Appearance: Normal appearance. She is well-developed and well-groomed.   HENT:      Head: Normocephalic and atraumatic.   Pulmonary:      Effort: Pulmonary effort is normal.   Musculoskeletal:      Cervical back: Normal range of motion.   Neurological:      General: No focal deficit present.      Mental Status: She is alert and oriented to person, place, and time.   Skin:     General: Skin is warm and dry.   Psychiatric:         Mood and Affect: Mood normal.         Behavior: Behavior normal. Behavior is cooperative.   Vitals reviewed.         ASSESSMENT/PLAN    Diagnoses and all orders for this visit:    1. Third trimester pregnancy (Primary)  -     POC Urinalysis Dipstick  -     Strep Gp B Culture+Rfx ([facility]) - Swab, Vaginal/Rectum    2. 36 weeks gestation of pregnancy  -     POC Urinalysis Dipstick  -     Strep Gp B Culture+Rfx ([facility]) - Swab, Vaginal/Rectum         Cervical Exam: performed: 1 cm, 20% , -3.  Urine dip today: negative  protein, negative glucose.   GBS swab collected and sent today.    Reviewed fetal kick counts.  Reviewed this stage of pregnancy.   Problem list updated.    Is aware of hospital location, when to go in.    Follow up in 1 week with Dr. Richardson.     I spent 15 minutes caring for Liz on this date of service. This time includes time spent by me in the following activities: preparing for the visit, reviewing tests, performing a medically appropriate examination and/or evaluation, counseling and educating the patient/family/caregiver, referring and communicating with other health care professionals, documenting information in the medical record, independently interpreting results and communicating that information with the patient/family/caregiver, care coordination, ordering medications, ordering test(s), ordering procedure(s), obtaining a separately obtained history, and reviewing a separately obtained history.     Julita Abernathy CNM  6/29/2025  10:23 EDT

## 2025-06-26 ENCOUNTER — ROUTINE PRENATAL (OUTPATIENT)
Dept: OBSTETRICS AND GYNECOLOGY | Facility: CLINIC | Age: 30
End: 2025-06-26
Payer: COMMERCIAL

## 2025-06-26 VITALS — BODY MASS INDEX: 33.3 KG/M2 | WEIGHT: 219 LBS | SYSTOLIC BLOOD PRESSURE: 109 MMHG | DIASTOLIC BLOOD PRESSURE: 72 MMHG

## 2025-06-26 DIAGNOSIS — Z3A.36 36 WEEKS GESTATION OF PREGNANCY: ICD-10-CM

## 2025-06-26 DIAGNOSIS — Z34.93 THIRD TRIMESTER PREGNANCY: Primary | ICD-10-CM

## 2025-06-26 LAB
GLUCOSE UR STRIP-MCNC: NEGATIVE MG/DL
PROT UR STRIP-MCNC: NEGATIVE MG/DL

## 2025-06-29 NOTE — PROGRESS NOTES
OB VISIT 37 WEEKS      Chief Complaint   Patient presents with    Routine Prenatal Visit         Liz is a 30 y.o.  37w5d being seen today for her obstetrical visit.  Patient reports no complaints. Fetal movement: normal. The patient currently sees Dr. Richardson.       REVIEW OF SYSTEMS  Cramping/contractions: denies  Vaginal bleeding: denies  Fetal movement: present  Leaking of fluid: denies    Review of Systems   Eyes:  Negative for blurred vision, double vision and visual disturbance.   Gastrointestinal:  Negative for abdominal pain.   Neurological:  Negative for light-headedness and headache.   All other systems reviewed and are negative.      /80   Wt 100 kg (221 lb)   LMP  (LMP Unknown)   BMI 33.60 kg/m²     Prenatal Assessment  Fetal Heart Rate: 148  Fundal Height (cm): 38 cm  Movement: Present  Presentation: Vertex  Edema  LLE Edema: None  RLE Edema: None  Facial Edema: None    Physical Exam  Constitutional:       General: She is awake.      Appearance: Normal appearance. She is well-developed and well-groomed.   HENT:      Head: Normocephalic and atraumatic.   Pulmonary:      Effort: Pulmonary effort is normal.   Musculoskeletal:      Cervical back: Normal range of motion.   Neurological:      General: No focal deficit present.      Mental Status: She is alert and oriented to person, place, and time.   Skin:     General: Skin is warm and dry.   Psychiatric:         Mood and Affect: Mood normal.         Behavior: Behavior normal. Behavior is cooperative.   Vitals reviewed.         ASSESSMENT/PLAN    Diagnoses and all orders for this visit:    1. Third trimester pregnancy (Primary)  -     POC Urinalysis Dipstick    2. 37 weeks gestation of pregnancy  -     POC Urinalysis Dipstick         Urine dip today: negative protein, negative glucose.   GBS results reviewed- negative.   Reviewed fetal kick counts.  Reviewed this stage of pregnancy.  Problem list updated.   Is aware of hospital location,  when to go in.    Follow up in 1 week with Dr. Richardson.     I spent 15 minutes caring for Liz on this date of service. This time includes time spent by me in the following activities: preparing for the visit, reviewing tests, performing a medically appropriate examination and/or evaluation, counseling and educating the patient/family/caregiver, referring and communicating with other health care professionals, documenting information in the medical record, independently interpreting results and communicating that information with the patient/family/caregiver, care coordination, ordering medications, ordering test(s), ordering procedure(s), obtaining a separately obtained history, and reviewing a separately obtained history.     Julita Abernathy CNM  7/8/2025  08:44 EDT  OB37

## 2025-06-30 LAB — B-HEM STREP SPEC QL CULT: NEGATIVE

## 2025-07-03 ENCOUNTER — ROUTINE PRENATAL (OUTPATIENT)
Dept: OBSTETRICS AND GYNECOLOGY | Facility: CLINIC | Age: 30
End: 2025-07-03
Payer: COMMERCIAL

## 2025-07-03 VITALS — WEIGHT: 221 LBS | SYSTOLIC BLOOD PRESSURE: 123 MMHG | BODY MASS INDEX: 33.6 KG/M2 | DIASTOLIC BLOOD PRESSURE: 80 MMHG

## 2025-07-03 DIAGNOSIS — Z3A.37 37 WEEKS GESTATION OF PREGNANCY: ICD-10-CM

## 2025-07-03 DIAGNOSIS — Z34.93 THIRD TRIMESTER PREGNANCY: Primary | ICD-10-CM

## 2025-07-03 LAB
GLUCOSE UR STRIP-MCNC: NEGATIVE MG/DL
PROT UR STRIP-MCNC: NEGATIVE MG/DL

## 2025-07-10 ENCOUNTER — ROUTINE PRENATAL (OUTPATIENT)
Dept: OBSTETRICS AND GYNECOLOGY | Facility: CLINIC | Age: 30
End: 2025-07-10
Payer: COMMERCIAL

## 2025-07-10 VITALS — WEIGHT: 222.8 LBS | DIASTOLIC BLOOD PRESSURE: 76 MMHG | BODY MASS INDEX: 33.88 KG/M2 | SYSTOLIC BLOOD PRESSURE: 113 MMHG

## 2025-07-10 DIAGNOSIS — Z3A.38 38 WEEKS GESTATION OF PREGNANCY: Primary | ICD-10-CM

## 2025-07-10 DIAGNOSIS — O09.899 SUPERVISION OF OTHER HIGH RISK PREGNANCIES, UNSPECIFIED TRIMESTER: ICD-10-CM

## 2025-07-10 DIAGNOSIS — O34.219 PREVIOUS CESAREAN DELIVERY, ANTEPARTUM: ICD-10-CM

## 2025-07-10 DIAGNOSIS — O35.10X0 FAMILY HISTORIC RISK OF CHROMOSOMAL ABNORMALITY IN FETUS, ANTEPARTUM, SINGLE OR UNSPECIFIED FETUS: ICD-10-CM

## 2025-07-10 LAB
GLUCOSE UR STRIP-MCNC: NEGATIVE MG/DL
PROT UR STRIP-MCNC: NEGATIVE MG/DL

## 2025-07-10 NOTE — PROGRESS NOTES
Chief complaint: Prenatal visit  History of present illness: Patient is here for her routine prenatal visit.  She is without major complaints at this time.  She denies vaginal bleeding, leakage of fluid.  She reports active fetal movement.  She does report some occasional mild contractions, but nothing regular.    Objective: See vital signs in the flowsheet  General: No acute distress, awake and oriented x3  Abdomen: Soft, nontender, gravid, fetal heart tones 140, fundal height 38 cm  Pelvic exam performed in the presence of a female chaperone.  Patient provided verbal consent to proceed with exam today.  Normal external female genitalia, no lesions  No vaginal discharge or bleeding  Cervix: 1 cm / 20% effaced/-3  Extremities: No lower extremity edema, no calf tenderness    Labs: GBS negative    Assessment:  1.  30-year-old  2 para 1 at 38-5/7 weeks gestational age  2.  History of prior low-transverse  x 1, desires a trial of labor  3.  History of prior infant with trisomy 21, normal NIPT this pregnancy    Plan:  1.  Labor signs discussed with the patient at length.  Also explained the patient to go to [facility] if she has the need to go to the hospital.  Address and directions to the hospital provided.  2. Return to the office in 1 week.

## 2025-07-17 ENCOUNTER — ROUTINE PRENATAL (OUTPATIENT)
Dept: OBSTETRICS AND GYNECOLOGY | Facility: CLINIC | Age: 30
End: 2025-07-17
Payer: COMMERCIAL

## 2025-07-17 VITALS — DIASTOLIC BLOOD PRESSURE: 87 MMHG | WEIGHT: 226.6 LBS | SYSTOLIC BLOOD PRESSURE: 129 MMHG | BODY MASS INDEX: 34.45 KG/M2

## 2025-07-17 DIAGNOSIS — Z3A.39 39 WEEKS GESTATION OF PREGNANCY: Primary | ICD-10-CM

## 2025-07-17 DIAGNOSIS — O09.899 SUPERVISION OF OTHER HIGH RISK PREGNANCIES, UNSPECIFIED TRIMESTER: ICD-10-CM

## 2025-07-17 DIAGNOSIS — O16.9 ELEVATED BLOOD PRESSURE AFFECTING PREGNANCY, ANTEPARTUM: ICD-10-CM

## 2025-07-17 DIAGNOSIS — O34.219 PREVIOUS CESAREAN DELIVERY, ANTEPARTUM: ICD-10-CM

## 2025-07-17 LAB
GLUCOSE UR STRIP-MCNC: NEGATIVE MG/DL
PROT UR STRIP-MCNC: NEGATIVE MG/DL

## 2025-07-17 RX ORDER — ONDANSETRON 2 MG/ML
4 INJECTION INTRAMUSCULAR; INTRAVENOUS EVERY 6 HOURS PRN
OUTPATIENT
Start: 2025-07-17

## 2025-07-17 RX ORDER — ONDANSETRON 2 MG/ML
4 INJECTION INTRAMUSCULAR; INTRAVENOUS EVERY 6 HOURS PRN
Status: CANCELLED | OUTPATIENT
Start: 2025-07-17

## 2025-07-17 RX ORDER — ONDANSETRON 4 MG/1
4 TABLET, ORALLY DISINTEGRATING ORAL EVERY 6 HOURS PRN
Status: CANCELLED | OUTPATIENT
Start: 2025-07-17

## 2025-07-17 RX ORDER — ACETAMINOPHEN 325 MG/1
650 TABLET ORAL EVERY 4 HOURS PRN
OUTPATIENT
Start: 2025-07-17

## 2025-07-17 RX ORDER — SODIUM CHLORIDE 0.9 % (FLUSH) 0.9 %
10 SYRINGE (ML) INJECTION EVERY 12 HOURS SCHEDULED
Status: CANCELLED | OUTPATIENT
Start: 2025-07-17

## 2025-07-17 RX ORDER — SODIUM CHLORIDE, SODIUM LACTATE, POTASSIUM CHLORIDE, CALCIUM CHLORIDE 600; 310; 30; 20 MG/100ML; MG/100ML; MG/100ML; MG/100ML
50 INJECTION, SOLUTION INTRAVENOUS CONTINUOUS
Status: CANCELLED | OUTPATIENT
Start: 2025-07-17 | End: 2025-07-20

## 2025-07-17 RX ORDER — MAGNESIUM CARB/ALUMINUM HYDROX 105-160MG
30 TABLET,CHEWABLE ORAL ONCE
Status: CANCELLED | OUTPATIENT
Start: 2025-07-17 | End: 2025-07-17

## 2025-07-17 RX ORDER — METHYLERGONOVINE MALEATE 0.2 MG/ML
200 INJECTION INTRAVENOUS ONCE AS NEEDED
Status: CANCELLED | OUTPATIENT
Start: 2025-07-17

## 2025-07-17 RX ORDER — OXYTOCIN/0.9 % SODIUM CHLORIDE 30/500 ML
999 PLASTIC BAG, INJECTION (ML) INTRAVENOUS ONCE
OUTPATIENT
Start: 2025-07-17

## 2025-07-17 RX ORDER — TERBUTALINE SULFATE 1 MG/ML
0.25 INJECTION SUBCUTANEOUS AS NEEDED
Status: CANCELLED | OUTPATIENT
Start: 2025-07-17

## 2025-07-17 RX ORDER — FAMOTIDINE 10 MG
20 TABLET ORAL EVERY 12 HOURS PRN
Status: CANCELLED | OUTPATIENT
Start: 2025-07-17

## 2025-07-17 RX ORDER — LIDOCAINE HYDROCHLORIDE 10 MG/ML
0.5 INJECTION, SOLUTION INFILTRATION; PERINEURAL ONCE AS NEEDED
Status: CANCELLED | OUTPATIENT
Start: 2025-07-17

## 2025-07-17 RX ORDER — CARBOPROST TROMETHAMINE 250 UG/ML
250 INJECTION, SOLUTION INTRAMUSCULAR AS NEEDED
Status: CANCELLED | OUTPATIENT
Start: 2025-07-17

## 2025-07-17 RX ORDER — SODIUM CHLORIDE 0.9 % (FLUSH) 0.9 %
10 SYRINGE (ML) INJECTION AS NEEDED
Status: CANCELLED | OUTPATIENT
Start: 2025-07-17

## 2025-07-17 RX ORDER — ACETAMINOPHEN 325 MG/1
650 TABLET ORAL EVERY 4 HOURS PRN
Status: CANCELLED | OUTPATIENT
Start: 2025-07-17

## 2025-07-17 RX ORDER — OXYTOCIN/0.9 % SODIUM CHLORIDE 30/500 ML
250 PLASTIC BAG, INJECTION (ML) INTRAVENOUS CONTINUOUS
OUTPATIENT
Start: 2025-07-17 | End: 2025-07-17

## 2025-07-17 RX ORDER — ONDANSETRON 4 MG/1
4 TABLET, ORALLY DISINTEGRATING ORAL EVERY 6 HOURS PRN
OUTPATIENT
Start: 2025-07-17

## 2025-07-17 RX ORDER — MISOPROSTOL 100 UG/1
800 TABLET ORAL AS NEEDED
Status: CANCELLED | OUTPATIENT
Start: 2025-07-17

## 2025-07-17 RX ORDER — FAMOTIDINE 10 MG/ML
20 INJECTION, SOLUTION INTRAVENOUS EVERY 12 HOURS PRN
Status: CANCELLED | OUTPATIENT
Start: 2025-07-17

## 2025-07-17 NOTE — LETTER
25    SCHEDULING FORM  C-SECTIONS/INDUCTIONS    Patient:  Liz Nevarez :  1995    Phone: [phone] (home)     OB provider:  Jass Richardson MD    Summary:  30 y.o. female     Type of Delivery:  Induction   Priority:  Medical    Desired Date: 25      Time: 1700    EDC Estimated Date of Delivery: 25  Cervical exam:  / /        Key Score:     Induction agent: Cook cathater    Gestational age at Desired date of Induction or CS: 40 weeks 1 days  ----------------------------------------------------------------------------  By ACOG Guidelines, women should be 39 weeks or greater before initiating an elective induction (non-medically indicated) delivery     Maternal Indications:   Hypertension complicating pregnancy:  cHTN, gHTN, HELLP, PreE; Previous Csection    Fetal/Placental Conditions:    ----------------------------------------------------------------------------  For patients less than 39 weeks with indications not included in the above list, [facility] consult is required prior to scheduling.    [facility] Approved indication:   Date of consult:      Additional considerations for induction priority:      I attest that the above entries are accurate to the best of my knowledge:    Jass Richardson MD  2025  14:11 EDT

## 2025-07-17 NOTE — PROGRESS NOTES
Chief complaint: Prenatal visit  History of present illness: Patient is here for her routine prenatal visit.  She is without major complaints at this time.  She denies vaginal bleeding, leakage of fluid.  She reports active fetal movement.  She does report some occasional mild contractions, but nothing regular.    Objective: See vital signs in the flowsheet  General: No acute distress, awake and oriented x3  Abdomen: Soft, nontender, gravid  Pelvic exam performed in the presence of a female chaperone.  Patient provided verbal consent to proceed with exam today.  Normal external female genitalia, no lesions  No vaginal discharge or bleeding  Cervix: 1 cm/Dr. Jain effaced/-2  Extremities: No lower extremity edema, no calf tenderness    Ultrasound:  Estimated fetal weight at the 87th percentile.  Abdominal circumference is the 89th percentile.  Normal amniotic fluid.  Biophysical profile normal   Cephalic presentation    Labs: Urine dip negative protein negative glucose    Assessment:  1.  30-year-old  2 para 1 at 39-5/7 weeks gestational age  2.  New onset elevated blood pressures today  3.  Previous  x 1, desires a trial of labor  4.  Previous history of infant with trisomy 21    Plan:  1.  Blood pressures are slightly elevated today.  She did have 1 mild range blood pressure reading.  She does not yet meet diagnostic criteria for gestational hypertension, but given recent uptick in blood pressure and having a headache yesterday, I would not recommend continued expectant management beyond this weekend.  We have discussed the options of expectant management versus labor induction.  Patient does have a history of 1 previous , so not a candidate for cervical ripening with prostaglandins, but would be a candidate for mechanical cervical ripening and oxytocin.  2.  We had a long conversation today regarding the use of oxytocin in labor and general and the use of oxytocin in the setting of a  prior uterine scar.  I explained that the ideal circumstances for spontaneous labor as this has higher rates of successful vaginal delivery; however, given elevated blood pressure readings today, I do not think the continued expectant management beyond the next few days is wise.  There is potential for worsening hypertension and increasing maternal and fetal risks.  Patient verbalized understanding of this and agrees with the plan.  Patient would like to try to avoid or minimize the use of oxytocin.  I think this is reasonable.  Will plan for cervical ripening balloon as the method of induction.  Explained patient can also use nipple stimulation while the balloon is in place.  If we successfully placed the balloon, once it is expelled she is more comfortable with oxytocin use at that point.  I think this is a reasonable plan.  3.  Labor signs were discussed with the patient.  Patient is instructed to always come to the hospital prior to labor induction if she believes she may be in labor.  4.  Preeclampsia labs today.  If there is a significant abnormality would need to proceed with delivery sooner than this weekend.  5. Patient and family extensively counseled about indications for labor induction. Discussed risks of induction/delivery including bleeding, blood transfusion, infection, perineal laceration, laceration repair, pain, scar, failure of repair, urinary retention, DVT, anesthesia complications, temporary or permanent nerve damage, organ failure, and even death. Also discussed possibility of  delivery and associated risks of  delivery. Risks of  delivery discussed including bleeding, blood transfusion, infection, scar, wound breakdown, pain, need for postop pain medications, inadvertent injury to GI/ structures and possible need for surgical repair at time of surgery or in the future, urinary retention, DVT, anesthesia complications, temporary or permanent nerve damage, organ  failure, and even death as well as potential need for  section in future pregnancies and subsequent morbidity therein.       Discussed plans for elective labor induction on .  The process was discussed with the patient.  She is advised to call 2 hours prior to her scheduled induction time.  Explained that scheduled times are just estimates and due to unforeseen circumstances such as increased patient volume, staffing issues, etc. they may not be able to admit her at the planned time.  The hospital will do their best to get the patient in as close to her planned induction time is possible.  Educational handouts on the induction process were provided to the patient.  All questions were answered.

## 2025-07-18 LAB
ALBUMIN SERPL-MCNC: 3.1 G/DL (ref 3.5–5.2)
ALBUMIN/GLOB SERPL: 1 G/DL
ALP SERPL-CCNC: 132 U/L (ref 39–117)
ALT SERPL-CCNC: 10 U/L (ref 1–33)
AST SERPL-CCNC: 16 U/L (ref 1–32)
BILIRUB SERPL-MCNC: <0.2 MG/DL (ref 0–1.2)
BUN SERPL-MCNC: 8 MG/DL (ref 6–20)
BUN/CREAT SERPL: 11.3 (ref 7–25)
CALCIUM SERPL-MCNC: 8.9 MG/DL (ref 8.6–10.5)
CHLORIDE SERPL-SCNC: 105 MMOL/L (ref 98–107)
CO2 SERPL-SCNC: 20.6 MMOL/L (ref 22–29)
CREAT SERPL-MCNC: 0.71 MG/DL (ref 0.57–1)
EGFRCR SERPLBLD CKD-EPI 2021: 118.2 ML/MIN/1.73
ERYTHROCYTE [DISTWIDTH] IN BLOOD BY AUTOMATED COUNT: 12.4 % (ref 12.3–15.4)
GLOBULIN SER CALC-MCNC: 3 GM/DL
GLUCOSE SERPL-MCNC: 120 MG/DL (ref 65–99)
HCT VFR BLD AUTO: 39.8 % (ref 34–46.6)
HGB BLD-MCNC: 13.6 G/DL (ref 12–15.9)
MCH RBC QN AUTO: 31.4 PG (ref 26.6–33)
MCHC RBC AUTO-ENTMCNC: 34.2 G/DL (ref 31.5–35.7)
MCV RBC AUTO: 91.9 FL (ref 79–97)
PLATELET # BLD AUTO: 207 10*3/MM3 (ref 140–450)
POTASSIUM SERPL-SCNC: 4.2 MMOL/L (ref 3.5–5.2)
PROT SERPL-MCNC: 6.1 G/DL (ref 6–8.5)
RBC # BLD AUTO: 4.33 10*6/MM3 (ref 3.77–5.28)
SODIUM SERPL-SCNC: 138 MMOL/L (ref 136–145)
WBC # BLD AUTO: 8.29 10*3/MM3 (ref 3.4–10.8)

## 2025-07-19 LAB
CREAT UR-MCNC: 49.4 MG/DL
PROT UR-MCNC: 6.7 MG/DL
PROT/CREAT UR: 135.6 MG/G CREA (ref 0–200)

## 2025-07-20 ENCOUNTER — ANESTHESIA (OUTPATIENT)
Dept: LABOR AND DELIVERY | Facility: HOSPITAL | Age: 30
End: 2025-07-20
Payer: COMMERCIAL

## 2025-07-20 ENCOUNTER — ANESTHESIA EVENT (OUTPATIENT)
Dept: LABOR AND DELIVERY | Facility: HOSPITAL | Age: 30
End: 2025-07-20
Payer: COMMERCIAL

## 2025-07-20 ENCOUNTER — HOSPITAL ENCOUNTER (OUTPATIENT)
Dept: LABOR AND DELIVERY | Facility: HOSPITAL | Age: 30
Discharge: HOME | End: 2025-07-20
Payer: COMMERCIAL

## 2025-07-20 ENCOUNTER — HOSPITAL ENCOUNTER (INPATIENT)
Facility: HOSPITAL | Age: 30
LOS: 4 days | Discharge: HOME | End: 2025-07-24
Attending: OBSTETRICS & GYNECOLOGY | Admitting: OBSTETRICS & GYNECOLOGY
Payer: COMMERCIAL

## 2025-07-20 DIAGNOSIS — O16.9 ELEVATED BLOOD PRESSURE AFFECTING PREGNANCY, ANTEPARTUM: ICD-10-CM

## 2025-07-20 DIAGNOSIS — Z98.891 STATUS POST CESAREAN DELIVERY: Primary | ICD-10-CM

## 2025-07-20 PROBLEM — O16.3 ELEVATED BLOOD PRESSURE AFFECTING PREGNANCY IN THIRD TRIMESTER, ANTEPARTUM: Status: ACTIVE | Noted: 2025-07-20

## 2025-07-20 LAB
ABO GROUP BLD: NORMAL
ALBUMIN SERPL-MCNC: 3.4 G/DL (ref 3.5–5.2)
ALBUMIN/GLOB SERPL: 1.1 G/DL
ALP SERPL-CCNC: 133 U/L (ref 39–117)
ALT SERPL W P-5'-P-CCNC: 13 U/L (ref 1–33)
ANION GAP SERPL CALCULATED.3IONS-SCNC: 14 MMOL/L (ref 5–15)
AST SERPL-CCNC: 15 U/L (ref 1–32)
BASOPHILS # BLD AUTO: 0.01 10*3/MM3 (ref 0–0.2)
BASOPHILS NFR BLD AUTO: 0.1 % (ref 0–1.5)
BILIRUB SERPL-MCNC: 0.2 MG/DL (ref 0–1.2)
BLD GP AB SCN SERPL QL: NEGATIVE
BUN SERPL-MCNC: 8 MG/DL (ref 6–20)
BUN/CREAT SERPL: 9 (ref 7–25)
CALCIUM SPEC-SCNC: 8.7 MG/DL (ref 8.6–10.5)
CHLORIDE SERPL-SCNC: 105 MMOL/L (ref 98–107)
CO2 SERPL-SCNC: 19 MMOL/L (ref 22–29)
CREAT SERPL-MCNC: 0.89 MG/DL (ref 0.57–1)
DEPRECATED RDW RBC AUTO: 44.8 FL (ref 37–54)
EGFRCR SERPLBLD CKD-EPI 2021: 90.1 ML/MIN/1.73
EOSINOPHIL # BLD AUTO: 0.03 10*3/MM3 (ref 0–0.4)
EOSINOPHIL NFR BLD AUTO: 0.3 % (ref 0.3–6.2)
ERYTHROCYTE [DISTWIDTH] IN BLOOD BY AUTOMATED COUNT: 13 % (ref 12.3–15.4)
GLOBULIN UR ELPH-MCNC: 3 GM/DL
GLUCOSE SERPL-MCNC: 80 MG/DL (ref 65–99)
HCT VFR BLD AUTO: 40.3 % (ref 34–46.6)
HGB BLD-MCNC: 13.2 G/DL (ref 12–15.9)
IMM GRANULOCYTES # BLD AUTO: 0.04 10*3/MM3 (ref 0–0.05)
IMM GRANULOCYTES NFR BLD AUTO: 0.4 % (ref 0–0.5)
LYMPHOCYTES # BLD AUTO: 2.43 10*3/MM3 (ref 0.7–3.1)
LYMPHOCYTES NFR BLD AUTO: 26.6 % (ref 19.6–45.3)
MCH RBC QN AUTO: 30.9 PG (ref 26.6–33)
MCHC RBC AUTO-ENTMCNC: 32.8 G/DL (ref 31.5–35.7)
MCV RBC AUTO: 94.4 FL (ref 79–97)
MONOCYTES # BLD AUTO: 0.76 10*3/MM3 (ref 0.1–0.9)
MONOCYTES NFR BLD AUTO: 8.3 % (ref 5–12)
NEUTROPHILS NFR BLD AUTO: 5.86 10*3/MM3 (ref 1.7–7)
NEUTROPHILS NFR BLD AUTO: 64.3 % (ref 42.7–76)
NRBC BLD AUTO-RTO: 0 /100 WBC (ref 0–0.2)
PLATELET # BLD AUTO: 210 10*3/MM3 (ref 140–450)
PMV BLD AUTO: 10.4 FL (ref 6–12)
POTASSIUM SERPL-SCNC: 3.8 MMOL/L (ref 3.5–5.2)
PROT SERPL-MCNC: 6.4 G/DL (ref 6–8.5)
RBC # BLD AUTO: 4.27 10*6/MM3 (ref 3.77–5.28)
RH BLD: POSITIVE
SODIUM SERPL-SCNC: 138 MMOL/L (ref 136–145)
T&S EXPIRATION DATE: NORMAL
TREPONEMA PALLIDUM IGG+IGM AB [PRESENCE] IN SERUM OR PLASMA BY IMMUNOASSAY: NORMAL
WBC NRBC COR # BLD AUTO: 9.13 10*3/MM3 (ref 3.4–10.8)

## 2025-07-20 PROCEDURE — 85025 COMPLETE CBC W/AUTO DIFF WBC: CPT | Performed by: OBSTETRICS & GYNECOLOGY

## 2025-07-20 PROCEDURE — 86901 BLOOD TYPING SEROLOGIC RH(D): CPT | Performed by: OBSTETRICS & GYNECOLOGY

## 2025-07-20 PROCEDURE — 86900 BLOOD TYPING SEROLOGIC ABO: CPT | Performed by: OBSTETRICS & GYNECOLOGY

## 2025-07-20 PROCEDURE — 0U7C7DJ DILATION OF CERVIX WITH INTRALUM DEV, TEMP, VIA OPENING: ICD-10-PCS | Performed by: OBSTETRICS & GYNECOLOGY

## 2025-07-20 PROCEDURE — 86780 TREPONEMA PALLIDUM: CPT | Performed by: OBSTETRICS & GYNECOLOGY

## 2025-07-20 PROCEDURE — 59200 INSERT CERVICAL DILATOR: CPT | Performed by: OBSTETRICS & GYNECOLOGY

## 2025-07-20 PROCEDURE — 86850 RBC ANTIBODY SCREEN: CPT | Performed by: OBSTETRICS & GYNECOLOGY

## 2025-07-20 PROCEDURE — 80053 COMPREHEN METABOLIC PANEL: CPT | Performed by: OBSTETRICS & GYNECOLOGY

## 2025-07-20 RX ORDER — FAMOTIDINE 20 MG/1
20 TABLET, FILM COATED ORAL EVERY 12 HOURS PRN
Status: DISCONTINUED | OUTPATIENT
Start: 2025-07-20 | End: 2025-07-22 | Stop reason: HOSPADM

## 2025-07-20 RX ORDER — MAGNESIUM CARB/ALUMINUM HYDROX 105-160MG
30 TABLET,CHEWABLE ORAL ONCE
Status: DISCONTINUED | OUTPATIENT
Start: 2025-07-20 | End: 2025-07-22 | Stop reason: HOSPADM

## 2025-07-20 RX ORDER — DIPHENHYDRAMINE HYDROCHLORIDE 50 MG/ML
12.5 INJECTION, SOLUTION INTRAMUSCULAR; INTRAVENOUS EVERY 8 HOURS PRN
Status: DISCONTINUED | OUTPATIENT
Start: 2025-07-20 | End: 2025-07-22 | Stop reason: HOSPADM

## 2025-07-20 RX ORDER — SODIUM CHLORIDE, SODIUM LACTATE, POTASSIUM CHLORIDE, CALCIUM CHLORIDE 600; 310; 30; 20 MG/100ML; MG/100ML; MG/100ML; MG/100ML
50 INJECTION, SOLUTION INTRAVENOUS CONTINUOUS
Status: DISCONTINUED | OUTPATIENT
Start: 2025-07-20 | End: 2025-07-23

## 2025-07-20 RX ORDER — SODIUM CHLORIDE 0.9 % (FLUSH) 0.9 %
10 SYRINGE (ML) INJECTION EVERY 12 HOURS SCHEDULED
Status: DISCONTINUED | OUTPATIENT
Start: 2025-07-20 | End: 2025-07-22 | Stop reason: HOSPADM

## 2025-07-20 RX ORDER — MORPHINE SULFATE 2 MG/ML
2 INJECTION, SOLUTION INTRAMUSCULAR; INTRAVENOUS
Status: DISCONTINUED | OUTPATIENT
Start: 2025-07-20 | End: 2025-07-22 | Stop reason: HOSPADM

## 2025-07-20 RX ORDER — FAMOTIDINE 10 MG/ML
20 INJECTION, SOLUTION INTRAVENOUS EVERY 12 HOURS PRN
Status: DISCONTINUED | OUTPATIENT
Start: 2025-07-20 | End: 2025-07-22 | Stop reason: HOSPADM

## 2025-07-20 RX ORDER — FENTANYL/ROPIVACAINE/NS/PF 2MCG/ML-.2
10 PLASTIC BAG, INJECTION (ML) INJECTION CONTINUOUS
Refills: 0 | Status: DISCONTINUED | OUTPATIENT
Start: 2025-07-20 | End: 2025-07-23

## 2025-07-20 RX ORDER — EPHEDRINE SULFATE 50 MG/ML
5 INJECTION, SOLUTION INTRAVENOUS
Status: DISCONTINUED | OUTPATIENT
Start: 2025-07-20 | End: 2025-07-22 | Stop reason: HOSPADM

## 2025-07-20 RX ORDER — TERBUTALINE SULFATE 1 MG/ML
0.25 INJECTION SUBCUTANEOUS AS NEEDED
Status: DISCONTINUED | OUTPATIENT
Start: 2025-07-20 | End: 2025-07-22 | Stop reason: HOSPADM

## 2025-07-20 RX ORDER — SODIUM CHLORIDE 0.9 % (FLUSH) 0.9 %
10 SYRINGE (ML) INJECTION AS NEEDED
Status: DISCONTINUED | OUTPATIENT
Start: 2025-07-20 | End: 2025-07-22 | Stop reason: HOSPADM

## 2025-07-20 RX ORDER — LIDOCAINE HYDROCHLORIDE 10 MG/ML
0.5 INJECTION, SOLUTION INFILTRATION; PERINEURAL ONCE AS NEEDED
Status: DISCONTINUED | OUTPATIENT
Start: 2025-07-20 | End: 2025-07-22 | Stop reason: HOSPADM

## 2025-07-20 RX ORDER — ONDANSETRON 2 MG/ML
4 INJECTION INTRAMUSCULAR; INTRAVENOUS ONCE AS NEEDED
Status: COMPLETED | OUTPATIENT
Start: 2025-07-20 | End: 2025-07-21

## 2025-07-20 RX ORDER — ONDANSETRON 4 MG/1
4 TABLET, ORALLY DISINTEGRATING ORAL EVERY 6 HOURS PRN
Status: DISCONTINUED | OUTPATIENT
Start: 2025-07-20 | End: 2025-07-22 | Stop reason: HOSPADM

## 2025-07-20 RX ORDER — ONDANSETRON 2 MG/ML
4 INJECTION INTRAMUSCULAR; INTRAVENOUS EVERY 6 HOURS PRN
Status: DISCONTINUED | OUTPATIENT
Start: 2025-07-20 | End: 2025-07-22 | Stop reason: HOSPADM

## 2025-07-20 RX ORDER — FAMOTIDINE 10 MG/ML
20 INJECTION, SOLUTION INTRAVENOUS ONCE AS NEEDED
Status: DISCONTINUED | OUTPATIENT
Start: 2025-07-20 | End: 2025-07-22 | Stop reason: HOSPADM

## 2025-07-20 RX ORDER — ACETAMINOPHEN 325 MG/1
650 TABLET ORAL EVERY 4 HOURS PRN
Status: DISCONTINUED | OUTPATIENT
Start: 2025-07-20 | End: 2025-07-22 | Stop reason: HOSPADM

## 2025-07-21 PROBLEM — O16.3 ELEVATED BLOOD PRESSURE AFFECTING PREGNANCY IN THIRD TRIMESTER, ANTEPARTUM: Status: RESOLVED | Noted: 2025-07-20 | Resolved: 2025-07-21

## 2025-07-21 PROBLEM — O13.3 GESTATIONAL HYPERTENSION WITHOUT SIGNIFICANT PROTEINURIA IN THIRD TRIMESTER: Status: ACTIVE | Noted: 2025-07-21

## 2025-07-21 PROCEDURE — 25810000003 LACTATED RINGERS PER 1000 ML: Performed by: OBSTETRICS & GYNECOLOGY

## 2025-07-21 PROCEDURE — 25010000002 ROPIVACAINE PER 1 MG: Performed by: STUDENT IN AN ORGANIZED HEALTH CARE EDUCATION/TRAINING PROGRAM

## 2025-07-21 PROCEDURE — 25010000002 LIDOCAINE-EPINEPHRINE (PF) 1.5 %-1:200000 SOLUTION: Performed by: STUDENT IN AN ORGANIZED HEALTH CARE EDUCATION/TRAINING PROGRAM

## 2025-07-21 PROCEDURE — 25010000002 MORPHINE PER 10 MG: Performed by: OBSTETRICS & GYNECOLOGY

## 2025-07-21 PROCEDURE — C1755 CATHETER, INTRASPINAL: HCPCS | Performed by: STUDENT IN AN ORGANIZED HEALTH CARE EDUCATION/TRAINING PROGRAM

## 2025-07-21 PROCEDURE — 59510 CESAREAN DELIVERY: CPT | Performed by: OBSTETRICS & GYNECOLOGY

## 2025-07-21 PROCEDURE — 25010000002 ONDANSETRON PER 1 MG: Performed by: STUDENT IN AN ORGANIZED HEALTH CARE EDUCATION/TRAINING PROGRAM

## 2025-07-21 PROCEDURE — 25010000002 FAMOTIDINE 10 MG/ML SOLUTION: Performed by: OBSTETRICS & GYNECOLOGY

## 2025-07-21 DEVICE — HEMOST ABS SURGICEL PWDR 3GM: Type: IMPLANTABLE DEVICE | Site: ABDOMEN | Status: FUNCTIONAL

## 2025-07-21 RX ORDER — OXYTOCIN/0.9 % SODIUM CHLORIDE 30/500 ML
999 PLASTIC BAG, INJECTION (ML) INTRAVENOUS ONCE
Status: COMPLETED | OUTPATIENT
Start: 2025-07-22 | End: 2025-07-22

## 2025-07-21 RX ORDER — LIDOCAINE HYDROCHLORIDE AND EPINEPHRINE 15; 5 MG/ML; UG/ML
INJECTION, SOLUTION EPIDURAL AS NEEDED
Status: DISCONTINUED | OUTPATIENT
Start: 2025-07-21 | End: 2025-07-22 | Stop reason: SURG

## 2025-07-21 RX ORDER — METHYLERGONOVINE MALEATE 0.2 MG/ML
200 INJECTION INTRAVENOUS ONCE AS NEEDED
Status: DISCONTINUED | OUTPATIENT
Start: 2025-07-21 | End: 2025-07-22

## 2025-07-21 RX ORDER — PHYTONADIONE 1 MG/.5ML
INJECTION, EMULSION INTRAMUSCULAR; INTRAVENOUS; SUBCUTANEOUS
Status: ACTIVE
Start: 2025-07-21 | End: 2025-07-22

## 2025-07-21 RX ORDER — OXYTOCIN/0.9 % SODIUM CHLORIDE 30/500 ML
2-20 PLASTIC BAG, INJECTION (ML) INTRAVENOUS
Status: DISCONTINUED | OUTPATIENT
Start: 2025-07-21 | End: 2025-07-23

## 2025-07-21 RX ORDER — ROPIVACAINE HYDROCHLORIDE 2 MG/ML
INJECTION, SOLUTION EPIDURAL; INFILTRATION; PERINEURAL AS NEEDED
Status: DISCONTINUED | OUTPATIENT
Start: 2025-07-21 | End: 2025-07-22 | Stop reason: SURG

## 2025-07-21 RX ORDER — MISOPROSTOL 200 UG/1
800 TABLET ORAL AS NEEDED
Status: DISCONTINUED | OUTPATIENT
Start: 2025-07-21 | End: 2025-07-22 | Stop reason: HOSPADM

## 2025-07-21 RX ORDER — TRANEXAMIC ACID 10 MG/ML
1000 INJECTION, SOLUTION INTRAVENOUS ONCE AS NEEDED
Status: DISCONTINUED | OUTPATIENT
Start: 2025-07-21 | End: 2025-07-23

## 2025-07-21 RX ORDER — FAMOTIDINE 10 MG/ML
20 INJECTION, SOLUTION INTRAVENOUS ONCE AS NEEDED
Status: DISCONTINUED | OUTPATIENT
Start: 2025-07-21 | End: 2025-07-22 | Stop reason: HOSPADM

## 2025-07-21 RX ORDER — OXYTOCIN/0.9 % SODIUM CHLORIDE 30/500 ML
250 PLASTIC BAG, INJECTION (ML) INTRAVENOUS CONTINUOUS
Status: ACTIVE | OUTPATIENT
Start: 2025-07-22 | End: 2025-07-22

## 2025-07-21 RX ORDER — ERYTHROMYCIN 5 MG/G
OINTMENT OPHTHALMIC
Status: ACTIVE
Start: 2025-07-21 | End: 2025-07-22

## 2025-07-21 RX ORDER — ACETAMINOPHEN 500 MG
1000 TABLET ORAL ONCE
Status: COMPLETED | OUTPATIENT
Start: 2025-07-22 | End: 2025-07-21

## 2025-07-21 RX ORDER — CARBOPROST TROMETHAMINE 250 UG/ML
250 INJECTION, SOLUTION INTRAMUSCULAR AS NEEDED
Status: DISCONTINUED | OUTPATIENT
Start: 2025-07-21 | End: 2025-07-22 | Stop reason: HOSPADM

## 2025-07-21 RX ADMIN — ROPIVACAINE HYDROCHLORIDE 5 ML: 2 INJECTION, SOLUTION EPIDURAL; INFILTRATION at 16:12

## 2025-07-21 RX ADMIN — ROPIVACAINE HYDROCHLORIDE 5 ML: 2 INJECTION, SOLUTION EPIDURAL; INFILTRATION at 16:15

## 2025-07-21 RX ADMIN — Medication 2 MILLI-UNITS/MIN: at 09:08

## 2025-07-21 RX ADMIN — EPHEDRINE SULFATE 5 MG: 50 INJECTION INTRAVENOUS at 22:20

## 2025-07-21 RX ADMIN — SODIUM CHLORIDE, POTASSIUM CHLORIDE, SODIUM LACTATE AND CALCIUM CHLORIDE 50 ML/HR: 600; 310; 30; 20 INJECTION, SOLUTION INTRAVENOUS at 22:33

## 2025-07-21 RX ADMIN — LIDOCAINE HYDROCHLORIDE AND EPINEPHRINE 3 ML: 15; 5 INJECTION, SOLUTION EPIDURAL at 16:09

## 2025-07-21 RX ADMIN — MORPHINE SULFATE 2 MG: 2 INJECTION, SOLUTION INTRAMUSCULAR; INTRAVENOUS at 00:38

## 2025-07-21 RX ADMIN — ACETAMINOPHEN 1000 MG: 500 TABLET, FILM COATED ORAL at 23:47

## 2025-07-21 RX ADMIN — Medication 10 ML/HR: at 16:15

## 2025-07-21 RX ADMIN — FAMOTIDINE 20 MG: 10 INJECTION, SOLUTION INTRAVENOUS at 23:47

## 2025-07-21 RX ADMIN — SODIUM CHLORIDE, POTASSIUM CHLORIDE, SODIUM LACTATE AND CALCIUM CHLORIDE 50 ML/HR: 600; 310; 30; 20 INJECTION, SOLUTION INTRAVENOUS at 09:08

## 2025-07-21 RX ADMIN — Medication 10 ML: at 00:37

## 2025-07-21 RX ADMIN — ONDANSETRON 4 MG: 2 INJECTION INTRAMUSCULAR; INTRAVENOUS at 21:40

## 2025-07-21 NOTE — OP NOTE
Procedure: Placement of cervical ripening balloon  Practitioner: Jass Richardson MD  Preprocedure diagnosis:   30-year-old  2 para 1 at 40 and 1 sevenths weeks gestational age  2.  Elevated blood pressures in pregnancy  3.  Previous  x 1, desires a trial of labor  Post procedure diagnosis: Same  Anesthesia: None  Pathology: None  Estimated blood loss: Less than 5 mL  Procedure in detail:  Patient counseled about placement of cervical ripening balloon.  Risk, benefits, alternatives discussed with the patient.  All questions answered and she wishes to proceed.  Timeout was performed by all team members and the patient.  Patient was doubly identified and the correct patient and procedure was confirmed.  Digital exam was performed with the findings as previously documented.  Cervix is 1.5 cm / 50% effaced/-2.  First, attempted to place the balloon digitally.  I was able to advance the catheter beyond the external os; however, was unable to advance it beyond the internal os.  With the catheter tip still sitting within the cervix, I placed a bivalve speculum in the vagina for better visualization.  The distal end of the catheter was then grasped with a ring forceps and with this was able to be advanced beyond the internal cervical os. The uterine balloon was then inflated with 50 cc of sterile water.  Patient tolerated this well with minimal discomfort.  All instruments were removed from the vagina.  The balloon was placed on gentle traction and taped to the patient's leg.  I was present for the entire procedure.  There were no complications.     NST: Category 1 and reactive  Tocometry: Irregular contractions without augmentation

## 2025-07-21 NOTE — H&P
[facility]   HISTORY AND PHYSICAL    Patient Name: Liz Nevarez  : 1995  MRN: [id number]  Primary Care Physician:  Prachi Chen APRN  Date of admission: 2025    Subjective   Subjective     Chief Complaint: Labor induction    HPI:    Liz Nevarez is a 30 y.o. female here for labor induction.  She has had a history of 1 prior  for nonreassuring fetal heart tones in labor.  She desires a trial of labor.  She had hoped for spontaneous labor; however, last week in the office was noted to have mild range blood pressure elevations.  Since she was 40 weeks gestation, recommended proceeding with delivery at that point.  Patient is currently comfortable.  Feeling contractions but they are manageable.  Denies vaginal bleeding or leakage of fluid.    Review of Systems    Constitutional: No fevers, chills, sweats   Eye: No recent visual problems, denies blurry vision   HEENT: No ear pain, nasal congestion, sore throat, voice changes   Respiratory: No shortness of breath, cough, pain on breathing   Cardiovascular: No Chest pain, palpitations   Gastrointestinal: No nausea, vomiting, diarrhea, constipation   Genitourinary: No hematuria, dysuria, lesions on genitalia   Hema/Lymph: Negative for bruising, no edema   Endocrine: Negative for excessive thirst, excessive hunger, heat or cold intolerance   Musculoskeletal: No joint pain, muscle pain, decreased range of motion   Integumentary: No rash, pruritus, abrasions, lesions   Neurologic: No weakness, numbness, headaches   Psychiatric: No anxiety, depression, mood changes         Personal History     History reviewed. No pertinent past medical history.    Past Surgical History:   Procedure Laterality Date     SECTION  2019    Emergency     EAR TUBES      HAND SURGERY      WISDOM TOOTH EXTRACTION         Family History: family history includes Diabetes in her maternal grandmother; Diabetes type I in her mother; Diabetic  kidney disease in her mother; No Known Problems in her father. Otherwise pertinent FHx was reviewed and not pertinent to current issue.    Social History:  reports that she has never smoked. She has never used smokeless tobacco. She reports that she does not currently use alcohol. She reports that she does not use drugs.    Home Medications:  prenatal vitamin 27-0.8      Allergies:  No Known Allergies    Objective   Objective     Vitals:   Temp:  [97.9 °F (36.6 °C)-98.3 °F (36.8 °C)] 98.3 °F (36.8 °C)  Heart Rate:  [63-80] 70  Resp:  [16-18] 18  BP: (122-141)/(73-87) 122/84  Physical Exam     General: No acute distress, awake and oriented x3   HEENT: Normocephalic atraumatic, moist mucous membranes   Eyes: Pupils equal round reactive to light and accommodation, extraocular muscles intact   Respiratory: Normal work of breathing, good air movement   Cardiovascular: Regular rate and rhythm, no murmurs   Abdomen: Gravid, soft, nontender   Pelvic exam performed in the presence of female RN chaperone.  Patient's significant other also present during the exam.  Patient provided verbal consent to proceed with the exam.   Pelvic exam: Normal external female genitalia, no lesions, no leakage of fluid   Cervix: 4 cm dilated/60% effaced/-2  Patient had cervical ripening balloon throughout the night which was expelled this morning.  Cephalic by palpation of sutures   Extremities: No calf tenderness, no lower extremity edema   Psychiatric: Good judgment and insight, normal affect and mood   Neurologic: Cranial nerves II through XII intact, no deficits   Skin: No rashes or lesions     Artificial rupture of membranes is performed with clear fluid noted.  Patient tolerated this well.      Result Review    Result Review:  I have personally reviewed the results from the time of this admission to 7/21/2025 11:08 EDT and agree with these findings:  [x]  Laboratory list / accordion  [x]  Microbiology  [x]  Radiology  []  EKG/Telemetry    []  Cardiology/Vascular   []  Pathology  []  Old records  [x]  Other: NST  Most notable findings include:     Admission on 07/20/2025   Component Date Value Ref Range Status    Treponemal AB Total 07/20/2025 Non-Reactive  Non-Reactive Final    Glucose 07/20/2025 80  65 - 99 mg/dL Final    BUN 07/20/2025 8.0  6.0 - 20.0 mg/dL Final    Creatinine 07/20/2025 0.89  0.57 - 1.00 mg/dL Final    Sodium 07/20/2025 138  136 - 145 mmol/L Final    Potassium 07/20/2025 3.8  3.5 - 5.2 mmol/L Final    Chloride 07/20/2025 105  98 - 107 mmol/L Final    CO2 07/20/2025 19.0 (L)  22.0 - 29.0 mmol/L Final    Calcium 07/20/2025 8.7  8.6 - 10.5 mg/dL Final    Total Protein 07/20/2025 6.4  6.0 - 8.5 g/dL Final    Albumin 07/20/2025 3.4 (L)  3.5 - 5.2 g/dL Final    ALT (SGPT) 07/20/2025 13  1 - 33 U/L Final    AST (SGOT) 07/20/2025 15  1 - 32 U/L Final    Alkaline Phosphatase 07/20/2025 133 (H)  39 - 117 U/L Final    Total Bilirubin 07/20/2025 0.2  0.0 - 1.2 mg/dL Final    Globulin 07/20/2025 3.0  gm/dL Final    A/G Ratio 07/20/2025 1.1  g/dL Final    BUN/Creatinine Ratio 07/20/2025 9.0  7.0 - 25.0 Final    Anion Gap 07/20/2025 14.0  5.0 - 15.0 mmol/L Final    eGFR 07/20/2025 90.1  >60.0 mL/min/1.73 Final    ABO Type 07/20/2025 A   Final    RH type 07/20/2025 Positive   Final    Antibody Screen 07/20/2025 Negative   Final    T&S Expiration Date 07/20/2025 7/23/2025 11:59:59 PM   Final    WBC 07/20/2025 9.13  3.40 - 10.80 10*3/mm3 Final    RBC 07/20/2025 4.27  3.77 - 5.28 10*6/mm3 Final    Hemoglobin 07/20/2025 13.2  12.0 - 15.9 g/dL Final    Hematocrit 07/20/2025 40.3  34.0 - 46.6 % Final    MCV 07/20/2025 94.4  79.0 - 97.0 fL Final    MCH 07/20/2025 30.9  26.6 - 33.0 pg Final    MCHC 07/20/2025 32.8  31.5 - 35.7 g/dL Final    RDW 07/20/2025 13.0  12.3 - 15.4 % Final    RDW-SD 07/20/2025 44.8  37.0 - 54.0 fl Final    MPV 07/20/2025 10.4  6.0 - 12.0 fL Final    Platelets 07/20/2025 210  140 - 450 10*3/mm3 Final    Neutrophil % 07/20/2025  64.3  42.7 - 76.0 % Final    Lymphocyte % 2025 26.6  19.6 - 45.3 % Final    Monocyte % 2025 8.3  5.0 - 12.0 % Final    Eosinophil % 2025 0.3  0.3 - 6.2 % Final    Basophil % 2025 0.1  0.0 - 1.5 % Final    Immature Grans % 2025 0.4  0.0 - 0.5 % Final    Neutrophils, Absolute 2025 5.86  1.70 - 7.00 10*3/mm3 Final    Lymphocytes, Absolute 2025 2.43  0.70 - 3.10 10*3/mm3 Final    Monocytes, Absolute 2025 0.76  0.10 - 0.90 10*3/mm3 Final    Eosinophils, Absolute 2025 0.03  0.00 - 0.40 10*3/mm3 Final    Basophils, Absolute 2025 0.01  0.00 - 0.20 10*3/mm3 Final    Immature Grans, Absolute 2025 0.04  0.00 - 0.05 10*3/mm3 Final    nRBC 2025 0.0  0.0 - 0.2 /100 WBC Final     GBS negative    US (25):  Estimated fetal weight at the 87th percentile (4095 g).  Abdominal circumference is the 89th percentile (36.51 cm).  Normal amniotic fluid.  Biophysical profile normal 8/8  Cephalic presentation    NST:  130/moderate variability/reactive/no decelerations  Tocometry: Contractions about every 2-3 minutes  Interpretation: Category 1    Assessment & Plan   Assessment / Plan     Brief Patient Summary:  Liz Nevarez is a 30 y.o. female  2 para 1 at 40-2/7 weeks gestational age with gestational hypertension without severe features  2.  Previous  x 1, desires a trial of labor  3.  Borderline LGA (estimated fetal weight 4095 g, the 87th percentile) and non-diabetic patient  4.  GBS negative  5.  Fetal heart tones category 1    Active Hospital Problems:  Active Hospital Problems    Diagnosis     **Elevated blood pressure affecting pregnancy in third trimester, antepartum      Plan:   Patient and family extensively counseled about indications for labor induction. Discussed risks of induction/delivery including bleeding, blood transfusion, infection, perineal laceration, laceration repair, pain, scar, failure of repair, urinary retention, DVT,  anesthesia complications, temporary or permanent nerve damage, organ failure, and even death. Also discussed possibility of  delivery and associated risks of  delivery. Risks of  delivery discussed including bleeding, blood transfusion, infection, scar, wound breakdown, pain, need for postop pain medications, inadvertent injury to GI/ structures and possible need for surgical repair at time of surgery or in the future, urinary retention, DVT, anesthesia complications, temporary or permanent nerve damage, organ failure, and even death as well as potential need for  section in future pregnancies and subsequent morbidity therein.     2.  Patient had a Cook catheter balloon in during the night which is expelled this morning.  Cervix is currently favorable.  She is now on oxytocin.  Artificial rupture membranes performed with clear fluid noted.  Plan of care and expectations discussed with the patient and her family.  They verbalized understanding.  She may have an epidural upon request.      VTE Prophylaxis:  Mechanical VTE prophylaxis orders are present.        CODE STATUS:    Code Status (Patient has no pulse and is not breathing): CPR (Attempt to Resuscitate)  Medical Interventions (Patient has pulse or is breathing): Full Support    Admission Status:  I believe this patient meets inpatient status.    Jass Richardson MD

## 2025-07-21 NOTE — ANESTHESIA PROCEDURE NOTES
Labor Epidural      Patient reassessed immediately prior to procedure    Patient location during procedure: OB  Start Time: 7/21/2025 4:05 PM  Stop Time: 7/21/2025 4:09 PM  Performed By  Anesthesiologist: Jass Rojas MD  Preanesthetic Checklist  Completed: patient identified, IV checked, site marked, risks and benefits discussed, surgical consent, monitors and equipment checked, pre-op evaluation and timeout performed  Additional Notes  Labor epidural using Arrow kit, no heme/CSF aspirated, no paraesthesias.  Test dose with 3cc 1.5% lido with epi is negative.    Prep:  Pt Position:sitting  Sterile Tech:cap, gloves, mask and sterile barrier  Prep:chlorhexidine gluconate and isopropyl alcohol  Monitoring:blood pressure monitoring, continuous pulse oximetry and EKG  Epidural Block Procedure:  Approach:midline  Guidance:landmark technique and palpation technique  Location:L4-L5  Needle Type:Tuohy  Needle Gauge:17  Loss of Resistance Medium: air  Loss of Resistance: 6cm  Cath Depth at skin:11 cm  Paresthesia: none  Aspiration:negative  Test Dose:negative  Number of Attempts: 1  Post Assessment:  Dressing:occlusive dressing applied and secured with tape  Pt Tolerance:patient tolerated the procedure well with no apparent complications  Complications:no

## 2025-07-21 NOTE — ANESTHESIA PREPROCEDURE EVALUATION
Anesthesia Evaluation     Patient summary reviewed and Nursing notes reviewed                Airway   Mallampati: II  TM distance: >3 FB  Neck ROM: full  Dental - normal exam     Pulmonary - negative pulmonary ROS   Cardiovascular         Neuro/Psych- negative ROS  GI/Hepatic/Renal/Endo      Musculoskeletal     Abdominal    Substance History      OB/GYN    (+) Pregnant        Other                    Anesthesia Plan    ASA 2     epidural     (40w2d    I have reviewed the patient's history with the patient and the chart, including all pertinent laboratory results and imaging. Risks of neuraxial anesthesia were discussed with patient including but not limited to: inadequate block, bleeding, infection, persistent numbness or weakness, nerve damage, painful dysesthesia and spinal headache.  )    Anesthetic plan, risks, benefits, and alternatives have been provided, discussed and informed consent has been obtained with: patient.    CODE STATUS:    Code Status (Patient has no pulse and is not breathing): CPR (Attempt to Resuscitate)  Medical Interventions (Patient has pulse or is breathing): Full Support

## 2025-07-21 NOTE — PLAN OF CARE
Goal Outcome Evaluation:        Outcome Evaluation: Patient comfortable with epidural, bedrest, IUPC in place, fetal heart tones remain reassuring, pitocin gtt infusing. Changing positions in bed per patient request.

## 2025-07-22 PROBLEM — Z98.891 STATUS POST CESAREAN DELIVERY: Status: ACTIVE | Noted: 2025-07-22

## 2025-07-22 PROCEDURE — 25810000003 SODIUM CHLORIDE 0.9 % SOLUTION 250 ML FLEX CONT: Performed by: OBSTETRICS & GYNECOLOGY

## 2025-07-22 PROCEDURE — 0503F POSTPARTUM CARE VISIT: CPT | Performed by: NURSE PRACTITIONER

## 2025-07-22 PROCEDURE — 25010000002 LIDOCAINE-EPINEPHRINE (PF) 2 %-1:200000 SOLUTION: Performed by: NURSE ANESTHETIST, CERTIFIED REGISTERED

## 2025-07-22 PROCEDURE — 25010000002 KETOROLAC TROMETHAMINE PER 15 MG: Performed by: OBSTETRICS & GYNECOLOGY

## 2025-07-22 PROCEDURE — 25010000002 HYDROMORPHONE PER 4 MG: Performed by: NURSE ANESTHETIST, CERTIFIED REGISTERED

## 2025-07-22 PROCEDURE — 25010000002 FENTANYL CITRATE (PF) 50 MCG/ML SOLUTION: Performed by: NURSE ANESTHETIST, CERTIFIED REGISTERED

## 2025-07-22 PROCEDURE — 88307 TISSUE EXAM BY PATHOLOGIST: CPT

## 2025-07-22 PROCEDURE — 25010000002 CEFAZOLIN PER 500 MG: Performed by: OBSTETRICS & GYNECOLOGY

## 2025-07-22 PROCEDURE — 25010000002 AZITHROMYCIN PER 500 MG: Performed by: OBSTETRICS & GYNECOLOGY

## 2025-07-22 RX ORDER — OXYTOCIN/0.9 % SODIUM CHLORIDE 30/500 ML
250 PLASTIC BAG, INJECTION (ML) INTRAVENOUS CONTINUOUS
Status: ACTIVE | OUTPATIENT
Start: 2025-07-22 | End: 2025-07-22

## 2025-07-22 RX ORDER — TRANEXAMIC ACID 10 MG/ML
1000 INJECTION, SOLUTION INTRAVENOUS ONCE AS NEEDED
Status: DISCONTINUED | OUTPATIENT
Start: 2025-07-22 | End: 2025-07-24 | Stop reason: HOSPADM

## 2025-07-22 RX ORDER — MISOPROSTOL 200 UG/1
600 TABLET ORAL ONCE AS NEEDED
Status: DISCONTINUED | OUTPATIENT
Start: 2025-07-22 | End: 2025-07-24 | Stop reason: HOSPADM

## 2025-07-22 RX ORDER — ACETAMINOPHEN 325 MG/1
650 TABLET ORAL EVERY 4 HOURS PRN
Status: DISCONTINUED | OUTPATIENT
Start: 2025-07-22 | End: 2025-07-24 | Stop reason: HOSPADM

## 2025-07-22 RX ORDER — ONDANSETRON 2 MG/ML
4 INJECTION INTRAMUSCULAR; INTRAVENOUS EVERY 6 HOURS PRN
Status: DISCONTINUED | OUTPATIENT
Start: 2025-07-22 | End: 2025-07-24 | Stop reason: HOSPADM

## 2025-07-22 RX ORDER — BISACODYL 10 MG
10 SUPPOSITORY, RECTAL RECTAL DAILY PRN
Status: DISCONTINUED | OUTPATIENT
Start: 2025-07-22 | End: 2025-07-24 | Stop reason: HOSPADM

## 2025-07-22 RX ORDER — SIMETHICONE 80 MG
80 TABLET,CHEWABLE ORAL 4 TIMES DAILY PRN
Status: DISCONTINUED | OUTPATIENT
Start: 2025-07-22 | End: 2025-07-24 | Stop reason: HOSPADM

## 2025-07-22 RX ORDER — HYDROMORPHONE HYDROCHLORIDE 1 MG/ML
0.5 INJECTION, SOLUTION INTRAMUSCULAR; INTRAVENOUS; SUBCUTANEOUS
Status: DISPENSED | OUTPATIENT
Start: 2025-07-22 | End: 2025-07-23

## 2025-07-22 RX ORDER — DEXMEDETOMIDINE HYDROCHLORIDE 100 UG/ML
INJECTION, SOLUTION INTRAVENOUS AS NEEDED
Status: DISCONTINUED | OUTPATIENT
Start: 2025-07-22 | End: 2025-07-22 | Stop reason: SURG

## 2025-07-22 RX ORDER — HYDROMORPHONE HYDROCHLORIDE 1 MG/ML
0.5 INJECTION, SOLUTION INTRAMUSCULAR; INTRAVENOUS; SUBCUTANEOUS
Status: ACTIVE | OUTPATIENT
Start: 2025-07-22 | End: 2025-07-23

## 2025-07-22 RX ORDER — OXYCODONE HYDROCHLORIDE 10 MG/1
10 TABLET ORAL EVERY 4 HOURS PRN
Status: DISCONTINUED | OUTPATIENT
Start: 2025-07-22 | End: 2025-07-24 | Stop reason: HOSPADM

## 2025-07-22 RX ORDER — OXYCODONE HYDROCHLORIDE 5 MG/1
5 TABLET ORAL EVERY 4 HOURS PRN
Status: DISCONTINUED | OUTPATIENT
Start: 2025-07-22 | End: 2025-07-24 | Stop reason: HOSPADM

## 2025-07-22 RX ORDER — DOCUSATE SODIUM 100 MG/1
100 CAPSULE, LIQUID FILLED ORAL 2 TIMES DAILY PRN
Status: DISCONTINUED | OUTPATIENT
Start: 2025-07-22 | End: 2025-07-24 | Stop reason: HOSPADM

## 2025-07-22 RX ORDER — HYDROCORTISONE 25 MG/G
CREAM TOPICAL 3 TIMES DAILY PRN
Status: DISCONTINUED | OUTPATIENT
Start: 2025-07-22 | End: 2025-07-24 | Stop reason: HOSPADM

## 2025-07-22 RX ORDER — BUPIVACAINE HCL/0.9 % NACL/PF 0.125 %
PLASTIC BAG, INJECTION (ML) EPIDURAL AS NEEDED
Status: DISCONTINUED | OUTPATIENT
Start: 2025-07-22 | End: 2025-07-22 | Stop reason: SURG

## 2025-07-22 RX ORDER — DIPHENHYDRAMINE HCL 25 MG
25 CAPSULE ORAL EVERY 4 HOURS PRN
Status: DISCONTINUED | OUTPATIENT
Start: 2025-07-22 | End: 2025-07-24 | Stop reason: HOSPADM

## 2025-07-22 RX ORDER — KETOROLAC TROMETHAMINE 30 MG/ML
30 INJECTION, SOLUTION INTRAMUSCULAR; INTRAVENOUS ONCE
Status: COMPLETED | OUTPATIENT
Start: 2025-07-22 | End: 2025-07-22

## 2025-07-22 RX ORDER — TRANEXAMIC ACID 100 MG/ML
INJECTION, SOLUTION INTRAVENOUS AS NEEDED
Status: DISCONTINUED | OUTPATIENT
Start: 2025-07-22 | End: 2025-07-22 | Stop reason: SURG

## 2025-07-22 RX ORDER — LIDOCAINE HYDROCHLORIDE AND EPINEPHRINE 20; 5 MG/ML; UG/ML
INJECTION, SOLUTION EPIDURAL; INFILTRATION; INTRACAUDAL; PERINEURAL AS NEEDED
Status: DISCONTINUED | OUTPATIENT
Start: 2025-07-22 | End: 2025-07-22 | Stop reason: SURG

## 2025-07-22 RX ORDER — IBUPROFEN 600 MG/1
600 TABLET, FILM COATED ORAL EVERY 6 HOURS
Status: DISCONTINUED | OUTPATIENT
Start: 2025-07-23 | End: 2025-07-24 | Stop reason: HOSPADM

## 2025-07-22 RX ORDER — OXYTOCIN/0.9 % SODIUM CHLORIDE 30/500 ML
999 PLASTIC BAG, INJECTION (ML) INTRAVENOUS ONCE
Status: DISCONTINUED | OUTPATIENT
Start: 2025-07-22 | End: 2025-07-24 | Stop reason: HOSPADM

## 2025-07-22 RX ORDER — PRENATAL VIT/IRON FUM/FOLIC AC 27MG-0.8MG
1 TABLET ORAL DAILY
Status: DISCONTINUED | OUTPATIENT
Start: 2025-07-22 | End: 2025-07-24 | Stop reason: HOSPADM

## 2025-07-22 RX ORDER — KETOROLAC TROMETHAMINE 15 MG/ML
15 INJECTION, SOLUTION INTRAMUSCULAR; INTRAVENOUS EVERY 6 HOURS
Status: COMPLETED | OUTPATIENT
Start: 2025-07-22 | End: 2025-07-23

## 2025-07-22 RX ORDER — ACETAMINOPHEN 325 MG/1
650 TABLET ORAL EVERY 6 HOURS
Status: DISCONTINUED | OUTPATIENT
Start: 2025-07-23 | End: 2025-07-24 | Stop reason: HOSPADM

## 2025-07-22 RX ORDER — FENTANYL CITRATE 50 UG/ML
INJECTION, SOLUTION INTRAMUSCULAR; INTRAVENOUS AS NEEDED
Status: DISCONTINUED | OUTPATIENT
Start: 2025-07-22 | End: 2025-07-22 | Stop reason: SURG

## 2025-07-22 RX ORDER — OXYTOCIN/0.9 % SODIUM CHLORIDE 30/500 ML
125 PLASTIC BAG, INJECTION (ML) INTRAVENOUS ONCE AS NEEDED
Status: COMPLETED | OUTPATIENT
Start: 2025-07-22 | End: 2025-07-22

## 2025-07-22 RX ORDER — ACETAMINOPHEN 500 MG
1000 TABLET ORAL EVERY 6 HOURS
Status: COMPLETED | OUTPATIENT
Start: 2025-07-22 | End: 2025-07-23

## 2025-07-22 RX ORDER — ONDANSETRON 4 MG/1
4 TABLET, ORALLY DISINTEGRATING ORAL EVERY 6 HOURS PRN
Status: DISCONTINUED | OUTPATIENT
Start: 2025-07-22 | End: 2025-07-24 | Stop reason: HOSPADM

## 2025-07-22 RX ORDER — CARBOPROST TROMETHAMINE 250 UG/ML
250 INJECTION, SOLUTION INTRAMUSCULAR ONCE AS NEEDED
Status: DISCONTINUED | OUTPATIENT
Start: 2025-07-22 | End: 2025-07-24 | Stop reason: HOSPADM

## 2025-07-22 RX ADMIN — ACETAMINOPHEN 1000 MG: 325 TABLET, FILM COATED ORAL at 18:59

## 2025-07-22 RX ADMIN — KETOROLAC TROMETHAMINE 15 MG: 15 INJECTION INTRAMUSCULAR at 08:20

## 2025-07-22 RX ADMIN — OXYCODONE HYDROCHLORIDE 10 MG: 10 TABLET ORAL at 15:24

## 2025-07-22 RX ADMIN — KETOROLAC TROMETHAMINE 30 MG: 30 INJECTION INTRAMUSCULAR; INTRAVENOUS at 01:52

## 2025-07-22 RX ADMIN — AZITHROMYCIN MONOHYDRATE 500 MG: 500 INJECTION, POWDER, LYOPHILIZED, FOR SOLUTION INTRAVENOUS at 00:17

## 2025-07-22 RX ADMIN — Medication 200 MCG: at 01:11

## 2025-07-22 RX ADMIN — Medication 150 MCG: at 00:26

## 2025-07-22 RX ADMIN — Medication 100 MCG: at 00:15

## 2025-07-22 RX ADMIN — KETOROLAC TROMETHAMINE 15 MG: 15 INJECTION INTRAMUSCULAR at 22:03

## 2025-07-22 RX ADMIN — LIDOCAINE HYDROCHLORIDE AND EPINEPHRINE 5 ML: 20; 5 INJECTION, SOLUTION EPIDURAL; INFILTRATION; INTRACAUDAL; PERINEURAL at 00:15

## 2025-07-22 RX ADMIN — Medication 200 MCG: at 00:37

## 2025-07-22 RX ADMIN — Medication 999 ML/HR: at 00:32

## 2025-07-22 RX ADMIN — OXYCODONE HYDROCHLORIDE 10 MG: 10 TABLET ORAL at 11:06

## 2025-07-22 RX ADMIN — ACETAMINOPHEN 1000 MG: 325 TABLET, FILM COATED ORAL at 11:05

## 2025-07-22 RX ADMIN — LIDOCAINE HYDROCHLORIDE AND EPINEPHRINE 10 ML: 20; 5 INJECTION, SOLUTION EPIDURAL; INFILTRATION; INTRACAUDAL; PERINEURAL at 00:10

## 2025-07-22 RX ADMIN — DOCUSATE SODIUM 100 MG: 100 CAPSULE, LIQUID FILLED ORAL at 22:03

## 2025-07-22 RX ADMIN — Medication 100 MCG: at 00:19

## 2025-07-22 RX ADMIN — Medication 150 MCG: at 00:41

## 2025-07-22 RX ADMIN — Medication 200 MCG: at 01:06

## 2025-07-22 RX ADMIN — OXYCODONE 5 MG: 5 TABLET ORAL at 02:45

## 2025-07-22 RX ADMIN — OXYCODONE HYDROCHLORIDE 10 MG: 10 TABLET ORAL at 19:19

## 2025-07-22 RX ADMIN — Medication 200 MCG: at 00:57

## 2025-07-22 RX ADMIN — Medication 200 MCG: at 00:49

## 2025-07-22 RX ADMIN — Medication 150 MCG: at 00:29

## 2025-07-22 RX ADMIN — Medication 100 MCG: at 01:15

## 2025-07-22 RX ADMIN — Medication 125 ML/HR: at 01:53

## 2025-07-22 RX ADMIN — FENTANYL CITRATE 100 MCG: 50 INJECTION, SOLUTION INTRAMUSCULAR; INTRAVENOUS at 00:16

## 2025-07-22 RX ADMIN — PRENATAL VITAMINS-IRON FUMARATE 27 MG IRON-FOLIC ACID 0.8 MG TABLET 1 TABLET: at 08:20

## 2025-07-22 RX ADMIN — ACETAMINOPHEN 1000 MG: 325 TABLET, FILM COATED ORAL at 05:29

## 2025-07-22 RX ADMIN — OXYCODONE HYDROCHLORIDE 10 MG: 10 TABLET ORAL at 06:44

## 2025-07-22 RX ADMIN — KETOROLAC TROMETHAMINE 15 MG: 15 INJECTION INTRAMUSCULAR at 15:25

## 2025-07-22 RX ADMIN — Medication 100 MCG: at 00:21

## 2025-07-22 RX ADMIN — DEXMEDETOMIDINE 10 MCG: 100 INJECTION, SOLUTION, CONCENTRATE INTRAVENOUS at 00:22

## 2025-07-22 RX ADMIN — Medication 100 MCG: at 00:17

## 2025-07-22 RX ADMIN — HYDROMORPHONE HYDROCHLORIDE 0.5 MG: 1 INJECTION, SOLUTION INTRAMUSCULAR; INTRAVENOUS; SUBCUTANEOUS at 04:40

## 2025-07-22 RX ADMIN — CEFAZOLIN 2 G: 2 INJECTION, POWDER, FOR SOLUTION INTRAMUSCULAR; INTRAVENOUS at 00:06

## 2025-07-22 RX ADMIN — TRANEXAMIC ACID 1000 MG: 100 INJECTION, SOLUTION INTRAVENOUS at 00:33

## 2025-07-22 RX ADMIN — Medication 100 MCG: at 00:23

## 2025-07-22 NOTE — LACTATION NOTE
P2,T mom exclusively pumped and bottle fed first baby for 6 months and had a good supply. Baby bf well in L&D on right breast and mom said she saw milk in his mouth. Attempted to latch to left breast in several positions and baby would not suck. Put in STS and gave mom a HP with 27 mm flange. Baby started to show feeding cues around 0600 so she tried on the right breast. He latched easily. Mom felt strong tugs and good jaw movement observed. Encouraged her to pump left breast for at least 10 minutes.With next feeding time try different positions on the left and offer first. Call  for help.

## 2025-07-22 NOTE — OP NOTE
Procedure: Repeat low transverse  delivery    Surgeon: Kadeem Richardson MD    Assistant: Ayad Hudson MD, who was instrumental in the delivery and with the operation, both with physical assistance completing the steps of the operation from their side of the table as well in assistance in clinical judgement during to procedure.     Preop diagnosis:   1.  30-year-old  2 para 1 at 40-3/7 weeks gestational age with arrest of dilation  2.  Failed trial of labor after  section  3.  Gestational hypertension  4.  Suspected borderline large for gestational age infant  5.  Fetal status currently reassuring    Postop diagnosis: Same    Indications:   Patient was brought in for labor induction due to new finding of gestational hypertension at 40+ weeks of gestation.  She has a history of 1 prior  desires a trial of labor.  Patient wishes first to attempt cervical ripening without the use of oxytocin due to concerns of oxytocin use in the setting of prior uterine scar.  Cervical ripening was performed with cervical balloon.  After expulsion of the balloon she was noted to be 3-4 cm dilated.  Oxytocin was initiated at that time and patient underwent artificial rupture membranes after a few hours with oxytocin.  Patient had made cervical change to 6 cm dilated and 70% effaced.  She made minimal cervical change.  On that point.  After 6 hours was still 6 centimeters dilated and 80% effaced.  Patient was having adequate contractions with the use of oxytocin at 20 milliunits/min.  She did eventually start to experience some recurrent subtle late decelerations, so oxytocin was decreased.  Contractions spaced with decrease of oxytocin and late decelerations resolved.  Tracing remains category 1 from the point.  Given lack of cervical change over the span of 6 hours despite adequate contractions, recommendation was made to proceed with  section.  Risk, benefits, alternatives were discussed with  the patient and family at length.  They verbalized understanding and wished to proceed with repeat     Findings: Male infant, Apgar 8/9, Weight 7 pounds 14 ounces;  Thin lower uterine segment.  Otherwise normal uterus, ovaries, and tubes bilaterally    Cord gases: Not collected    Anesthesia: Epidural    EBL: 750 mL; quantitative blood loss not yet calculated    Pathology: Placenta    Complications: None    Procedure: Patient was taken to operating room. After adequate epidural anesthesia was placed on OR table in dorsal supine position with a left tilt. Abdomen was prepped and draped in a normal, sterile fashion. Patient identified with two identifiers and timeout performed with all team members and patient agreeing to the planned procedure.  It was confirmed that the patient had received Kefzol 2 grams and azithromycin 500 mg prior to the start of the incision.  A Pfannenstiel incision made with the knife and taken through the subcutaneous tissue to the fascia with the knife. The fascia scored in the midline and the incision was extended laterally with curved Milligan scissors. The superior rectus fascia was grasped with Kocher clamps times two and divided off the underlying rectus muscles. This was repeated on the inferior aspect. The rectus muscles were then  in the midline and the parietal peritoneum was entered digitally. The incision was extended bluntly and the bladder blade inserted. The vesicouterine peritoneum was tented upward and incision with curved Metzenbaum scissors and the incision was extended laterally. The bladder flap was then created digitally and the bladder blade replaced.  A low transverse uterine incision was made with the knife and extended laterally with finger fraction. The head was found to be occiput posterior and was delivered through the uterine incision. The oropharynx and nares were bulb-suctioned, the cord was clamped times two and cut, and the infant handed to the  awaiting pediatricians. The infant was immediately vigorous. Cord blood was then collected. The placenta delivered spontaneously intact. A three vessel cord was noted.  Tranexamic acid 1000 mg IV was administered prophylactically due to suspected large for gestational age infant, prolonged labor, high-dose oxytocin.  The uterus was delivered onto the maternal abdomen and wiped clean of clots and debris.  There was about a 4 cm extension inferiorly on the left-hand side.  The left apex of the uterine incision/extension was grasped with an Allis and elevated.  Closure was started at this area.  The uterine incision was then closed in two layers of 0 monocryl, the first layer in a running locked fashion and the second layer imbricating the first. Good hemostasis was noted. The posterior cul de sac was inspected and the uterus was returned to the abdomen. The pelvic cavity was wiped clean of clots. The uterine incision was again inspected and found to be hemostatic. The parietal peritoneum was reapproximated with 2-0 vicryl. The fascia was closed with 0 vicryl in a running fascia. The subcutaneous tissue was irrigated and made hemostatic with the Bovie and was then closed with 2-0 plain gut. The skin was closed with 4-0 monocryl in a subcuticular fashion. Dermabond was applied.  Counts for needles, sponges, laps and instruments were correct times two at the end of the procedure. I was present and scrubbed for the entire procedure. There were no major complications. The patient was transported to the recovery area in stable condition. Mother and baby were bonding well at the end of the procedure.

## 2025-07-22 NOTE — PLAN OF CARE
Goal Outcome Evaluation:  Plan of Care Reviewed With: patient        Progress: improving  Outcome Evaluation: S/p repeat c/s. Pain and bleeding controlled. Assisting with breastfeeding. Incision intact and open to air. .

## 2025-07-22 NOTE — PROGRESS NOTES
Section Progress Note    Assessment & Plan     Status post  section: Doing well postoperatively.   Continue current care. Anticipate discharge home in the next 2-3 days.     2.  DVT Prophylaxis: BMI 34.45.  SCD's while at rest. Encouraged ambulation.     3. GHTN: No current medications. BP normal range. Labs negative PIH. Asymptomatic. Will continue to monitor    Hgb:13.2  Rh status: A+  Syphilis screen in hospital: non reactive  Rubella: Not immune, MMR ordered  Gender: male, desires circumcision     Subjective     Postpartum Day 0:  Delivery    The patient feels tired. The patient denies emotional concerns. Pain is well controlled with current medications. The baby is well.Urinary output is adequate. The patient is ambulating well. The patient is tolerating a normal diet. Patient denies passing flatus.    Objective     Vital signs in last 24 hours:  Temp:  [97.8 °F (36.6 °C)-98.6 °F (37 °C)] 98.3 °F (36.8 °C)  Heart Rate:  [] 78  Resp:  [16-18] 18  BP: ()/(48-90) 118/85    General:    alert, appears stated age, and cooperative   CV: RRR, no m/r/g   Lungs: CTAB, no wheezes, no respiratory distress   Bowel Sounds:  active   Lochia:  appropriate   Uterine Fundus:   firm   Incision:  healing well, no significant drainage, no dehiscence, no significant erythema   DVT Evaluation:  No evidence of DVT seen on physical exam.     Lab Results   Component Value Date    WBC 9.13 2025    HGB 13.2 2025    HCT 40.3 2025    MCV 94.4 2025     2025         Maryjane Merritt, APRN  2025  09:07 EDT

## 2025-07-22 NOTE — PROGRESS NOTES
PROGRESS NOTE:   POSTOP DAY 1      PATIENT: iLz Nevarez        MR#:[id number]  LOCATION: [facility]  DATE OF ADMISSION: 2025  ADMISSION  DIAGNOSIS:   Status post  delivery    Supervision of other high risk pregnancies, unspecified trimester    Previous  delivery, antepartum    Family historic risk of chromosomal abnormality in fetus, antepartum    Gestational hypertension without significant proteinuria in third trimester    Failed trial of labor following previous , delivered     CURRENT DIAGNOSIS: No notes have been filed under this hospital service.  Service: Hospitalist    SERVICE: Obstetrics      Status post  delivery    Supervision of other high risk pregnancies, unspecified trimester    Previous  delivery, antepartum    Family historic risk of chromosomal abnormality in fetus, antepartum    Gestational hypertension without significant proteinuria in third trimester    Failed trial of labor following previous , delivered        PROCEDURES:  , Low Transverse    2025   12:31 AM     ASSESSMENT/PLAN  Status Post  Delivery Day 1:   Postpartum care immediately following  delivery: Doing well postoperatively. Continue routine postoperative and supportive care. Discontinue IV, advance diet, may shower.  DVT Prophylaxis: BMI 34.45. SCD's while at rest.  Encouraged ambulation.   Postpartum anemia: hgb prior to delivery 13.2, 11.2 on postpartum.  mL. Asymptomatic.  Continue prenatal vitamin with daily medications.   Gestational hypertension: Blood pressures on postpartum are in normal range.  Lab results negative for preeclampsia.  Asymptomatic.  Will continue to monitor closely.  Not currently on any antihypertensives.    RH STATUS: A positive  SYPHILIS SCREEN DELIVERY ADMIT: treponemal antibody non-reactive upon admission  RUBELLA: non-immune  VARICELLA: unknown immunity  INFANT GENDER: male, desires  circumcision when infant clinical picture allows. Risks & benefits discussed with patient who voices understanding.     Subjective    30 y.o. yo Female  status post  section day 1 at 40w3d doing well. Liz denies any emotional concerns. Pain well controlled with current medications. Lochia appropriate. She is tolerating a regular diet and passing flatus. Urinary output has been adequate.         Objective   Vitals  Temp:  Temp:  [97.2 °F (36.2 °C)-98.4 °F (36.9 °C)] 97.2 °F (36.2 °C)  Temp src: Oral  BP:  BP: (115-130)/(63-79) 116/74  Pulse:  Heart Rate:  [59-79] 59  RR:   Resp:  [16] 16    General:  Awake, alert, no acute distress   Cardiac: Regular rate and rhythm    Respiratory: Lungs clear bilaterally, normal respiratory effort    Abdomen: Soft, non-distended, fundus firm, below umbilicus, appropriately tender   Incision: Healing well, no dehiscence, no significant drainage, no erythema or exudate   Pelvis: deferred   Extremities: Calves NT bilaterally, DTR 2+, no clonus noted, trace edema     I/O last 3 completed shifts:  In: 1337 [I.V.:1087; IV Piggyback:250]  Out: 2849 [Urine:2500; Blood:349]  Lab Results   Component Value Date    WBC 12.21 (H) 2025    HGB 11.2 (L) 2025    HCT 33.2 (L) 2025    MCV 93.5 2025     2025    GLU 97 2019    CREATININE 0.89 2025    URICACID 5.8 (H) 2018    AST 15 2025    ALT 13 2025     2018    HEPBSAG Negative 2024     Results from last 7 days   Lab Units 25   ABO TYPING  A   RH TYPING  Positive   ANTIBODY SCREEN  Negative       Julita Abernathy CNM  2025   10:20 EDT

## 2025-07-22 NOTE — PROGRESS NOTES
Subjective: Patient comfortable with epidural placed.    Objective:  Vitals:    25 2304 25 2309 25 2314 25 2315   BP:    115/73   BP Location:       Patient Position:       Pulse: 72 75 71 72   Resp:       Temp:       TempSrc:       SpO2: 99% 97% 98%    Height:         Cervical exam recently performed by RN.  Cervical exam noted to be 6 cm dilated/80% effaced/-2 station.  Cervical exam not significantly changed since the time of my exam at roughly 1700 when she was noted to be 6 cm dilated/70% effaced/-2.  She has had subsequent cervical exams at 1935 and 2135 that were effectively unchanged.    NST:  130/reactive/moderate variability/patient began having recurrent subtle late decelerations around 2200.  These were detected by the nurse, and subsequently the oxytocin was decreased.  After decreasing the oxytocin at around 2300, contraction pattern spaced out, and the decelerations resolved.    Tocometry: Prior to decreasing oxytocin, patient was having adequate contractions about every 2-3 minutes.  At that time Gorham units averaged about 200-220.  After decreasing oxytocin, contractions suboptimal    Patient had rupture membranes now roughly 13 hours ago.    Assessment:  1.  30-year-old  2 para 1 at 40-2/7 weeks gestational age with arrest of dilation  2.  Failed trial of labor after  section  3.  Gestational hypertension  4.  Suspected borderline large for gestational age infant  5.  Fetal status currently reassuring    Plan:  1.  We discussed at length with the patient and her family current findings.  Patient has had no cervical change over the last roughly 6 hours.  She has been 6 cm dilated during this time.  During that time span, she has had an adequate contraction pattern until the last hour when her oxytocin had to be decreased.  She has had rupture membranes for 13 hours now.  2.  Additionally, patient had recent run of repetitive late decelerations, which did  resolve with intrauterine resuscitation.  3.  Given the above findings, mostly the arrest of further cervical change despite being 6 cm dilated and adequate contractions, I would recommend proceeding with  section at this time.  Risks of  section including bleeding, infection, scarring, inadvertent injury to GI/ tract structures, increased hospital stay, increased pain, DVT risk, need for future  sections, etc. discussed with patient and family who verbalized understanding.  They verbalized understanding and wished to proceed with  at this time as recommended.  4.  Will make preparations for  section.  Plan routine antibiotic prophylaxis with cefazolin 2 g and azithromycin 500 mg for failed labor.  Will plan TXA at cord clamp for slight increased risk of postpartum hemorrhage given borderline LGA, prolonged labor, failed TOLAC.

## 2025-07-22 NOTE — LACTATION NOTE
This note was copied from a baby's chart.  Rounded on mom but all were asleep in room at this time.  Did not disturb at this time.  LC # on WB.

## 2025-07-22 NOTE — ANESTHESIA POSTPROCEDURE EVALUATION
Patient: Liz Nevarez    Procedure Summary       Date: 25 Room / Location:  [facility] /  [facility]    Anesthesia Start: 1601 Anesthesia Stop: 25 013    Procedure:  SECTION REPEAT (Abdomen) Diagnosis: (failed trial of labor after c/s- FAILED TOLAC)    Surgeons: Jass Richardson MD Provider: Jass Rojas MD    Anesthesia Type: epidural ASA Status: 2            Anesthesia Type: epidural    Vitals  Vitals Value Taken Time   /77 25 05:36   Temp 37 °C (98.6 °F) 25 05:36   Pulse 65 25 05:36   Resp 16 25 05:36   SpO2 95 % 25 03:44           Anesthesia Post Evaluation

## 2025-07-22 NOTE — LACTATION NOTE
This note was copied from a baby's chart.  P2 term baby, 15hrs old. Mom calling for latch assistance but baby was too sleepy and would not suckle after latching to breast. Multiple unsuccessful attempts were made to help baby breast feed after Mom expressed milk to him.  Baby has nursed some and Mom is also pumping giving baby EBM. Encouraged pumping every 2-3hrs feeding baby all EBM after pumping until baby begins to wake and breast feed better.   Discussed milk production, how to know baby is getting enough milk, feeding cues and encouraged to call for any assistance.

## 2025-07-23 LAB
BASOPHILS # BLD AUTO: 0.03 10*3/MM3 (ref 0–0.2)
BASOPHILS NFR BLD AUTO: 0.2 % (ref 0–1.5)
DEPRECATED RDW RBC AUTO: 43.6 FL (ref 37–54)
EOSINOPHIL # BLD AUTO: 0.1 10*3/MM3 (ref 0–0.4)
EOSINOPHIL NFR BLD AUTO: 0.8 % (ref 0.3–6.2)
ERYTHROCYTE [DISTWIDTH] IN BLOOD BY AUTOMATED COUNT: 12.8 % (ref 12.3–15.4)
HCT VFR BLD AUTO: 33.2 % (ref 34–46.6)
HGB BLD-MCNC: 11.2 G/DL (ref 12–15.9)
IMM GRANULOCYTES # BLD AUTO: 0.09 10*3/MM3 (ref 0–0.05)
IMM GRANULOCYTES NFR BLD AUTO: 0.7 % (ref 0–0.5)
LYMPHOCYTES # BLD AUTO: 2.77 10*3/MM3 (ref 0.7–3.1)
LYMPHOCYTES NFR BLD AUTO: 22.7 % (ref 19.6–45.3)
MCH RBC QN AUTO: 31.5 PG (ref 26.6–33)
MCHC RBC AUTO-ENTMCNC: 33.7 G/DL (ref 31.5–35.7)
MCV RBC AUTO: 93.5 FL (ref 79–97)
MONOCYTES # BLD AUTO: 0.74 10*3/MM3 (ref 0.1–0.9)
MONOCYTES NFR BLD AUTO: 6.1 % (ref 5–12)
NEUTROPHILS NFR BLD AUTO: 69.5 % (ref 42.7–76)
NEUTROPHILS NFR BLD AUTO: 8.48 10*3/MM3 (ref 1.7–7)
NRBC BLD AUTO-RTO: 0 /100 WBC (ref 0–0.2)
PLATELET # BLD AUTO: 168 10*3/MM3 (ref 140–450)
PMV BLD AUTO: 10 FL (ref 6–12)
RBC # BLD AUTO: 3.55 10*6/MM3 (ref 3.77–5.28)
WBC NRBC COR # BLD AUTO: 12.21 10*3/MM3 (ref 3.4–10.8)

## 2025-07-23 PROCEDURE — 85025 COMPLETE CBC W/AUTO DIFF WBC: CPT | Performed by: OBSTETRICS & GYNECOLOGY

## 2025-07-23 PROCEDURE — 0503F POSTPARTUM CARE VISIT: CPT

## 2025-07-23 PROCEDURE — 25010000002 KETOROLAC TROMETHAMINE PER 15 MG: Performed by: OBSTETRICS & GYNECOLOGY

## 2025-07-23 RX ADMIN — ACETAMINOPHEN 1000 MG: 325 TABLET, FILM COATED ORAL at 03:03

## 2025-07-23 RX ADMIN — ACETAMINOPHEN 325MG 650 MG: 325 TABLET ORAL at 08:50

## 2025-07-23 RX ADMIN — DOCUSATE SODIUM 100 MG: 100 CAPSULE, LIQUID FILLED ORAL at 21:18

## 2025-07-23 RX ADMIN — IBUPROFEN 600 MG: 600 TABLET ORAL at 12:37

## 2025-07-23 RX ADMIN — ACETAMINOPHEN 325MG 650 MG: 325 TABLET ORAL at 21:18

## 2025-07-23 RX ADMIN — KETOROLAC TROMETHAMINE 15 MG: 15 INJECTION INTRAMUSCULAR at 05:23

## 2025-07-23 RX ADMIN — ACETAMINOPHEN 325MG 650 MG: 325 TABLET ORAL at 15:27

## 2025-07-23 RX ADMIN — OXYCODONE HYDROCHLORIDE 10 MG: 10 TABLET ORAL at 01:15

## 2025-07-23 RX ADMIN — PRENATAL VITAMINS-IRON FUMARATE 27 MG IRON-FOLIC ACID 0.8 MG TABLET 1 TABLET: at 09:00

## 2025-07-23 RX ADMIN — IBUPROFEN 600 MG: 600 TABLET ORAL at 18:48

## 2025-07-23 NOTE — DISCHARGE SUMMARY
SECTION DISCHARGE SUMMARY    PATIENT: Liz Nevarez        MR#:[id number]  LOCATION: [facility]  ADMISSION  DIAGNOSIS: Status post  delivery  DISCHARGE DIAGNOSIS:   1. Status post  delivery    2. Elevated blood pressure affecting pregnancy, antepartum      SERVICE: Obstetrics    DATE OF ADMISSION: 2025  DATE OF DISCHARGE:  25       Status post  delivery    Supervision of other high risk pregnancies, unspecified trimester    Previous  delivery, antepartum    Family historic risk of chromosomal abnormality in fetus, antepartum    Gestational hypertension without significant proteinuria in third trimester    Failed trial of labor following previous , delivered        PROCEDURES:  , Low Transverse    2025   12:31 AM     RH STATUS: A positive  SYPHILIS SCREEN DELIVERY ADMIT: treponemal antibody non-reactive upon admission  RUBELLA: non-immune  VARICELLA: unknown immunity  INFANT GENDER: male, desires circumcision when infant clinical picture allows. Risks & benefits discussed with patient who voices understanding.  Circumcision performed prior to discharge by RADHA Basilio.    SERVICE: Obstetrics    HOSPITAL COURSE:  Liz underwent  section of a male infant and remained in the hospital for 4 days. During that time, Liz remained afebrile and hemodynamically stable. On the day of discharge, Liz was eating, ambulating, passing flatus, and voiding without difficulty.  Her hemoglobin was 11.2 postpartum.     LABS:    Lab Results (last 24 hours)       ** No results found for the last 24 hours. **            DISCHARGE MEDICATIONS     Discharge Medications        New Medications        Instructions Start Date   docusate sodium 100 MG capsule   100 mg, Oral, 2 Times Daily PRN      ibuprofen 600 MG tablet  Commonly known as: ADVIL,MOTRIN   600 mg, Oral, Every 6 Hours      oxyCODONE-acetaminophen 5-325 MG per tablet  Commonly known as:  Percocet   1 tablet, Oral, Every 8 Hours PRN             Continue These Medications        Instructions Start Date   prenatal vitamin 27-0.8 27-0.8 MG tablet tablet   Daily               DISCHARGE DISPOSITION:  Home    DISCHARGE CONDITION: Stable    DISCHARGE DIET: Regular    ACTIVITY AT DISCHARGE: Pelvic rest    INFANT FEEDING PLANS: Breast    EDUCATION: Warning signs and symptoms given, no tub baths, nothing in the vagina for 6 weeks, no driving for 2 weeks while on narcotics.     FOLLOW-UP APPOINTMENTS: Follow up with [facility] in 1 week for blood pressure check in 2 weeks for incision check with Dr. Richardson   in 4 to 6 weeks for routine postpartum visit.    Julita Abernathy CNM  07/24/25  10:47 EDT

## 2025-07-23 NOTE — PROGRESS NOTES
PROGRESS NOTE:   POSTOP DAY 2      PATIENT: Liz Nevarez        MR#:[id number]  LOCATION: [facility]  DATE OF ADMISSION: 2025  ADMISSION  DIAGNOSIS:   Status post  delivery    Supervision of other high risk pregnancies, unspecified trimester    Previous  delivery, antepartum    Family historic risk of chromosomal abnormality in fetus, antepartum    Gestational hypertension without significant proteinuria in third trimester    Failed trial of labor following previous , delivered     CURRENT DIAGNOSIS: No notes have been filed under this hospital service.  Service: Hospitalist    SERVICE: Obstetrics      Status post  delivery    Supervision of other high risk pregnancies, unspecified trimester    Previous  delivery, antepartum    Family historic risk of chromosomal abnormality in fetus, antepartum    Gestational hypertension without significant proteinuria in third trimester    Failed trial of labor following previous , delivered        PROCEDURES:  , Low Transverse    2025   12:31 AM       Status Post  Delivery Day 2:   Postpartum care immediately following  delivery day 2: Doing well postoperatively. Continue routine postoperative and supportive care. Discontinue IV, advance diet, may shower. Requests discharge home today. Meeting all milestones for discharge.   DVT Prophylaxis: BMI 34.45. SCD's while at rest.  Encouraged ambulation.   Postpartum anemia: hgb prior to delivery 13.2, 11.2 on postpartum.  mL. Asymptomatic.  Continue prenatal vitamin with daily medications.   Gestational hypertension: Blood pressures on postpartum are in normal range.  Lab results negative for preeclampsia.  Asymptomatic.  Will continue to monitor closely.  Not currently on any antihypertensives.  Discharge to home: Discharge instructions given, precautions reviewed. Follow up with [facility] in 1 week for blood pressure check  in 2 weeks for incision check with Dr. Richardson   in 4 to 6 weeks for routine postpartum visit. Prescription for ibuprofen 600mg PO every 6 hours PRN for pain, docusate 100mg PO BID, ferrous sulfate 325mg daily, and oxycodone/acetaminophen 5/325 every 6 hours PRN for pain. Advised no tampons, menstrual cups, intercourse, or tub baths for 6 weeks. No driving for 2 weeks.    RH STATUS: A positive  SYPHILIS SCREEN DELIVERY ADMIT: treponemal antibody non-reactive upon admission  RUBELLA: non-immune  VARICELLA: unknown immunity  INFANT GENDER: male, desires circumcision when infant clinical picture allows. Risks & benefits discussed with patient who voices understanding.  Circumcision performed by RADHA Basilio.    Subjective     30 y.o. yo Female  status post  section day 2 at 40w3d doing well. Pain well controlled. Lochia appropriate. The patient requests discharge home today.     Objective   Vitals  Temp:  Temp:  [97.2 °F (36.2 °C)-98.5 °F (36.9 °C)] 98.5 °F (36.9 °C)  Temp src: Oral  BP:  BP: (116-130)/(74-79) 121/76  Pulse:  Heart Rate:  [59-79] 77  RR:   Resp:  [16] 16    General:  Awake, alert, no acute distress   Cardiac: Regular rate and rhythm    Respiratory: Lungs clear bilaterally, normal respiratory effort    Abdomen: Soft, non-distended, fundus firm, below umbilicus, appropriately tender   Incision: Healing well, no dehiscence, no significant drainage, no erythema or exudate   Pelvis: deferred   Extremities: Calves NT bilaterally, DTR 2+, no clonus noted, trace edema     I/O last 3 completed shifts:  In: 1337 [I.V.:1087; IV Piggyback:250]  Out: 2849 [Urine:2500; Blood:349]  Lab Results   Component Value Date    WBC 12.21 (H) 2025    HGB 11.2 (L) 2025    HCT 33.2 (L) 2025    MCV 93.5 2025     2025    GLU 97 2019    CREATININE 0.89 2025    URICACID 5.8 (H) 2018    AST 15 2025    ALT 13 2025     2018    HEPBSAG  Negative 12/26/2024     Results from last 7 days   Lab Units 07/20/25 2014   ABO TYPING  A   RH TYPING  Positive   ANTIBODY SCREEN  Negative           Julita Abernathy CNM  7/23/2025   12:50 EDT

## 2025-07-23 NOTE — LACTATION NOTE
This note was copied from a baby's chart.  RN requested LC consult.  Reports baby has been sleepy today since 1100 feeding and will be going for a circumcision soon.  Would like baby to Bf prior to the procedure.  When arrived to room, baby was latched to R breast in cross cradle hold and doing well.  Baby has been latched for about 30 min now.   Mom reports some nipple soreness but denies any damage.  Encouraged to pump while baby is in nursery for procedure and can give EBM next feeding if needed.

## 2025-07-24 VITALS
OXYGEN SATURATION: 95 % | RESPIRATION RATE: 16 BRPM | HEIGHT: 68 IN | DIASTOLIC BLOOD PRESSURE: 78 MMHG | SYSTOLIC BLOOD PRESSURE: 121 MMHG | BODY MASS INDEX: 34.45 KG/M2 | TEMPERATURE: 97.6 F | HEART RATE: 59 BPM

## 2025-07-24 PROCEDURE — 0503F POSTPARTUM CARE VISIT: CPT

## 2025-07-24 RX ORDER — OXYCODONE AND ACETAMINOPHEN 5; 325 MG/1; MG/1
1 TABLET ORAL EVERY 8 HOURS PRN
Qty: 9 TABLET | Refills: 0 | Status: SHIPPED | OUTPATIENT
Start: 2025-07-24

## 2025-07-24 RX ORDER — IBUPROFEN 600 MG/1
600 TABLET, FILM COATED ORAL EVERY 6 HOURS
Qty: 60 TABLET | Refills: 0 | Status: SHIPPED | OUTPATIENT
Start: 2025-07-24

## 2025-07-24 RX ORDER — PSEUDOEPHEDRINE HCL 30 MG
100 TABLET ORAL 2 TIMES DAILY PRN
Qty: 60 CAPSULE | Refills: 2 | Status: SHIPPED | OUTPATIENT
Start: 2025-07-24

## 2025-07-24 RX ADMIN — DOCUSATE SODIUM 100 MG: 100 CAPSULE, LIQUID FILLED ORAL at 09:49

## 2025-07-24 RX ADMIN — ACETAMINOPHEN 325MG 650 MG: 325 TABLET ORAL at 03:15

## 2025-07-24 RX ADMIN — IBUPROFEN 600 MG: 600 TABLET ORAL at 06:45

## 2025-07-24 RX ADMIN — PRENATAL VITAMINS-IRON FUMARATE 27 MG IRON-FOLIC ACID 0.8 MG TABLET 1 TABLET: at 09:49

## 2025-07-24 RX ADMIN — ACETAMINOPHEN 325MG 650 MG: 325 TABLET ORAL at 09:48

## 2025-07-24 RX ADMIN — IBUPROFEN 600 MG: 600 TABLET ORAL at 00:25

## 2025-07-24 NOTE — PLAN OF CARE
Goal Outcome Evaluation:  Plan of Care Reviewed With: patient, spouse        Progress: improving     Vital signs stable. Fundus firm, lochia light. Incision well approximated, no redness, edema or drainage noted. Pain is controlled with po medication. Up ad susan. Voiding without difficulty. Tolerating regular diet. Pt would like discharge home today.

## 2025-07-24 NOTE — LACTATION NOTE
This note was copied from a baby's chart.  PT is going home today. Reports baby is BF well. Informed her about the [facility] info on the back of the edicational booklet. Discussed engourgement, pumping, milk storage and when to expect mature milk. PT denieis any questions.

## 2025-07-25 ENCOUNTER — MATERNAL SCREENING (OUTPATIENT)
Dept: CALL CENTER | Facility: HOSPITAL | Age: 30
End: 2025-07-25
Payer: COMMERCIAL

## 2025-07-25 NOTE — OUTREACH NOTE
Maternal Screening Survey      Flowsheet Row Responses   Eligibility Eligible   Prep survey completed? Yes   Facility patient discharged from? [facility] - Registered Nurse

## 2025-07-29 ENCOUNTER — POSTPARTUM VISIT (OUTPATIENT)
Dept: OBSTETRICS AND GYNECOLOGY | Facility: CLINIC | Age: 30
End: 2025-07-29
Payer: COMMERCIAL

## 2025-07-29 VITALS
BODY MASS INDEX: 31.34 KG/M2 | WEIGHT: 206.8 LBS | HEART RATE: 65 BPM | SYSTOLIC BLOOD PRESSURE: 134 MMHG | HEIGHT: 68 IN | DIASTOLIC BLOOD PRESSURE: 87 MMHG

## 2025-07-29 DIAGNOSIS — Z09 POSTOPERATIVE EXAMINATION: Primary | ICD-10-CM

## 2025-07-29 DIAGNOSIS — Z30.09 BIRTH CONTROL COUNSELING: ICD-10-CM

## 2025-07-29 NOTE — PROGRESS NOTES
"Chief Complaint  Postpartum Care (Pt here for 1 week postpartum following  2025.)    Subjective        Liz Nevarez presents to [facility]  History of Present Illness  Patient is 1 week out from repeat .  She had labor induction secondary to gestational hypertension.  She says she is feeling well at this time.  Denies headache or visual disturbances.  She is tolerating a regular diet.  She is ambulating and voiding without difficulty.  She and the baby are doing well.  She reports light vaginal bleeding.  She reports just some soreness around the incision, but she has not required any oxycodone since the first day after leaving the hospital, and she is starting to wean herself off of ibuprofen.  She feels she is doing well with pain control and does not request any pain medication refills.  Patient is interested in contraception.  She would like to have an IUD.  She would like Kyleena because it is smaller.  She says she previously had Liletta and she felt that it was uncomfortable for her.    The following portions of the patient's history were reviewed and updated as appropriate: allergies, current medications, past family history, past medical history, past social history, past surgical history, and problem list.    Objective   Vital Signs:  /87   Pulse 65   Ht 172.7 cm (68\")   Wt 93.8 kg (206 lb 12.8 oz)   BMI 31.44 kg/m²   Estimated body mass index is 31.44 kg/m² as calculated from the following:    Height as of this encounter: 172.7 cm (68\").    Weight as of this encounter: 93.8 kg (206 lb 12.8 oz).          Physical Exam   General: No acute distress, awake and oriented x3  Abdomen: Soft, nontender, nondistended  Incision: Pfannenstiel incision clean, dry, intact  Psychiatric: good judgment and insight, normal mood  Neurological: cranial nerves II through XII intact, no deficits    Result Review :                Assessment and Plan   Diagnoses and all " orders for this visit:    1. Postoperative examination (Primary)    2. Birth control counseling    3. Gestational hypertension without significant proteinuria, postpartum    Patient is doing well at this time.  May resume light activities.  Advised no strenuous activity, no heavy lifting, and pelvic rest for at least 6 weeks postpartum.  Blood pressures are in the prehypertensive range today.  Do not feel it is necessary to initiate oral antihypertensive medication.    Various contraceptive options discussed including combined oral contraceptives, contraceptive patch, NuvaRing, progesterone only contraceptive, Depo-Provera, Nexplanon, progesterone IUD, copper IUD, and barrier methods. Patient elects progesterone intrauterine device. Risks, benefits, alternatives discussed including risks of bleeding, infection, subfertility, infertility, scarring, pelvic pain, irregular bleeding, amenorrhea, dyspareunia, migration/perforation/expulsion of device, possible need for surgery for repair, unwanted/ectopic pregnancy, weight gain, and need for surgical removal of device. All questions answered. Patient desires t Kyleena IUD.  We will obtain prior authorization and plan for insertion           Follow Up   Return 5 weeks postpartum and ultrasound-guided Kyleena insertion.  Patient was given instructions and counseling regarding her condition or for health maintenance advice. Please see specific information pulled into the AVS if appropriate.

## 2025-08-04 ENCOUNTER — MATERNAL SCREENING (OUTPATIENT)
Dept: CALL CENTER | Facility: HOSPITAL | Age: 30
End: 2025-08-04
Payer: COMMERCIAL

## 2025-08-05 ENCOUNTER — MATERNAL SCREENING (OUTPATIENT)
Dept: CALL CENTER | Facility: HOSPITAL | Age: 30
End: 2025-08-05
Payer: COMMERCIAL

## (undated) DEVICE — SUT PLAIN  3/0 CT1 27IN 842H

## (undated) DEVICE — SUT MNCRYL PLS ANTIB UD 4/0 PS2 18IN

## (undated) DEVICE — SOL IRR H2O BO 1000ML STRL

## (undated) DEVICE — GLV SURG SENSICARE PI LF PF 7.0

## (undated) DEVICE — SUT MNCRYL 0/0 CTX 36IN Y398H

## (undated) DEVICE — EXOFIN PRECISION PEN HIGH VISCOSITY TOPICAL SKIN ADHESIVE: Brand: EXOFIN PRECISION PEN, 1G

## (undated) DEVICE — ANTIBACTERIAL UNDYED BRAIDED (POLYGLACTIN 910), SYNTHETIC ABSORBABLE SUTURE: Brand: COATED VICRYL